# Patient Record
Sex: MALE | Race: WHITE | Employment: OTHER | URBAN - METROPOLITAN AREA
[De-identification: names, ages, dates, MRNs, and addresses within clinical notes are randomized per-mention and may not be internally consistent; named-entity substitution may affect disease eponyms.]

---

## 2018-06-21 ENCOUNTER — TELEPHONE (OUTPATIENT)
Dept: FAMILY MEDICINE CLINIC | Age: 68
End: 2018-06-21

## 2018-06-21 ENCOUNTER — TELEPHONE (OUTPATIENT)
Dept: CARDIOLOGY CLINIC | Age: 68
End: 2018-06-21

## 2018-06-21 ENCOUNTER — OFFICE VISIT (OUTPATIENT)
Dept: FAMILY MEDICINE CLINIC | Age: 68
End: 2018-06-21

## 2018-06-21 VITALS
SYSTOLIC BLOOD PRESSURE: 136 MMHG | HEIGHT: 70 IN | WEIGHT: 315 LBS | BODY MASS INDEX: 45.1 KG/M2 | DIASTOLIC BLOOD PRESSURE: 84 MMHG

## 2018-06-21 DIAGNOSIS — I48.91 NEW ONSET ATRIAL FIBRILLATION (HCC): Primary | ICD-10-CM

## 2018-06-21 DIAGNOSIS — Z00.00 PREVENTATIVE HEALTH CARE: ICD-10-CM

## 2018-06-21 DIAGNOSIS — R53.1 WEAKNESS: ICD-10-CM

## 2018-06-21 DIAGNOSIS — Z23 NEED FOR PNEUMOCOCCAL VACCINATION: ICD-10-CM

## 2018-06-21 DIAGNOSIS — R06.02 SHORTNESS OF BREATH: ICD-10-CM

## 2018-06-21 DIAGNOSIS — E66.01 MORBID OBESITY DUE TO EXCESS CALORIES (HCC): ICD-10-CM

## 2018-06-21 DIAGNOSIS — Z23 NEED FOR ZOSTER VACCINATION: ICD-10-CM

## 2018-06-21 DIAGNOSIS — Z11.59 ENCOUNTER FOR HEPATITIS C SCREENING TEST FOR LOW RISK PATIENT: ICD-10-CM

## 2018-06-21 DIAGNOSIS — Z13.6 SCREENING FOR AAA (ABDOMINAL AORTIC ANEURYSM): ICD-10-CM

## 2018-06-21 PROBLEM — I48.20 CHRONIC ATRIAL FIBRILLATION (HCC): Status: ACTIVE | Noted: 2018-06-21

## 2018-06-21 LAB
ANION GAP SERPL CALCULATED.3IONS-SCNC: 15 MMOL/L (ref 3–16)
BUN BLDV-MCNC: 21 MG/DL (ref 7–20)
CALCIUM SERPL-MCNC: 9.3 MG/DL (ref 8.3–10.6)
CHLORIDE BLD-SCNC: 107 MMOL/L (ref 99–110)
CHOLESTEROL, TOTAL: 184 MG/DL (ref 0–199)
CO2: 22 MMOL/L (ref 21–32)
CREAT SERPL-MCNC: 0.9 MG/DL (ref 0.8–1.3)
GFR AFRICAN AMERICAN: >60
GFR NON-AFRICAN AMERICAN: >60
GLUCOSE BLD-MCNC: 101 MG/DL (ref 70–99)
HCT VFR BLD CALC: 41.6 % (ref 40.5–52.5)
HDLC SERPL-MCNC: 57 MG/DL (ref 40–60)
HEMOGLOBIN: 13.9 G/DL (ref 13.5–17.5)
HEPATITIS C ANTIBODY INTERPRETATION: NORMAL
LDL CHOLESTEROL CALCULATED: 109 MG/DL
MCH RBC QN AUTO: 29.7 PG (ref 26–34)
MCHC RBC AUTO-ENTMCNC: 33.4 G/DL (ref 31–36)
MCV RBC AUTO: 89 FL (ref 80–100)
PDW BLD-RTO: 14 % (ref 12.4–15.4)
PLATELET # BLD: 233 K/UL (ref 135–450)
PMV BLD AUTO: 9 FL (ref 5–10.5)
POTASSIUM SERPL-SCNC: 4.6 MMOL/L (ref 3.5–5.1)
PRO-BNP: 1023 PG/ML (ref 0–124)
RBC # BLD: 4.67 M/UL (ref 4.2–5.9)
SODIUM BLD-SCNC: 144 MMOL/L (ref 136–145)
TRIGL SERPL-MCNC: 89 MG/DL (ref 0–150)
TSH SERPL DL<=0.05 MIU/L-ACNC: 1.65 UIU/ML (ref 0.27–4.2)
VLDLC SERPL CALC-MCNC: 18 MG/DL
WBC # BLD: 6.2 K/UL (ref 4–11)

## 2018-06-21 PROCEDURE — 90670 PCV13 VACCINE IM: CPT | Performed by: FAMILY MEDICINE

## 2018-06-21 PROCEDURE — G8417 CALC BMI ABV UP PARAM F/U: HCPCS | Performed by: FAMILY MEDICINE

## 2018-06-21 PROCEDURE — 1123F ACP DISCUSS/DSCN MKR DOCD: CPT | Performed by: FAMILY MEDICINE

## 2018-06-21 PROCEDURE — 4040F PNEUMOC VAC/ADMIN/RCVD: CPT | Performed by: FAMILY MEDICINE

## 2018-06-21 PROCEDURE — G0009 ADMIN PNEUMOCOCCAL VACCINE: HCPCS | Performed by: FAMILY MEDICINE

## 2018-06-21 PROCEDURE — G8427 DOCREV CUR MEDS BY ELIG CLIN: HCPCS | Performed by: FAMILY MEDICINE

## 2018-06-21 PROCEDURE — 3017F COLORECTAL CA SCREEN DOC REV: CPT | Performed by: FAMILY MEDICINE

## 2018-06-21 PROCEDURE — 36415 COLL VENOUS BLD VENIPUNCTURE: CPT | Performed by: FAMILY MEDICINE

## 2018-06-21 PROCEDURE — 1036F TOBACCO NON-USER: CPT | Performed by: FAMILY MEDICINE

## 2018-06-21 PROCEDURE — 99214 OFFICE O/P EST MOD 30 MIN: CPT | Performed by: FAMILY MEDICINE

## 2018-06-21 PROCEDURE — 93000 ELECTROCARDIOGRAM COMPLETE: CPT | Performed by: FAMILY MEDICINE

## 2018-06-21 ASSESSMENT — PATIENT HEALTH QUESTIONNAIRE - PHQ9
2. FEELING DOWN, DEPRESSED OR HOPELESS: 0
SUM OF ALL RESPONSES TO PHQ QUESTIONS 1-9: 1
1. LITTLE INTEREST OR PLEASURE IN DOING THINGS: 1
SUM OF ALL RESPONSES TO PHQ9 QUESTIONS 1 & 2: 1

## 2018-06-21 ASSESSMENT — ENCOUNTER SYMPTOMS
COUGH: 0
SHORTNESS OF BREATH: 1
WHEEZING: 0

## 2018-06-22 ENCOUNTER — HOSPITAL ENCOUNTER (OUTPATIENT)
Dept: ULTRASOUND IMAGING | Age: 68
Discharge: HOME | End: 2018-06-22
Attending: FAMILY MEDICINE | Admitting: FAMILY MEDICINE

## 2018-06-22 DIAGNOSIS — Z13.6 ENCOUNTER FOR SCREENING FOR CARDIOVASCULAR DISORDERS: ICD-10-CM

## 2018-06-22 DIAGNOSIS — Z13.6 SCREENING FOR AAA (ABDOMINAL AORTIC ANEURYSM): ICD-10-CM

## 2018-06-22 RX ORDER — FUROSEMIDE 40 MG/1
40 TABLET ORAL DAILY
Qty: 7 TABLET | Refills: 0 | Status: SHIPPED | OUTPATIENT
Start: 2018-06-22 | End: 2018-06-27 | Stop reason: SDUPTHER

## 2018-06-25 ENCOUNTER — TELEPHONE (OUTPATIENT)
Dept: FAMILY MEDICINE CLINIC | Age: 68
End: 2018-06-25

## 2018-06-27 ENCOUNTER — OFFICE VISIT (OUTPATIENT)
Dept: CARDIOLOGY CLINIC | Age: 68
End: 2018-06-27

## 2018-06-27 VITALS
BODY MASS INDEX: 45.1 KG/M2 | SYSTOLIC BLOOD PRESSURE: 134 MMHG | HEART RATE: 78 BPM | OXYGEN SATURATION: 96 % | DIASTOLIC BLOOD PRESSURE: 78 MMHG | WEIGHT: 315 LBS | HEIGHT: 70 IN

## 2018-06-27 DIAGNOSIS — I48.91 NEW ONSET ATRIAL FIBRILLATION (HCC): Primary | ICD-10-CM

## 2018-06-27 DIAGNOSIS — R06.02 SOB (SHORTNESS OF BREATH): ICD-10-CM

## 2018-06-27 DIAGNOSIS — I48.91 NEW ONSET ATRIAL FIBRILLATION (HCC): ICD-10-CM

## 2018-06-27 LAB
ANION GAP SERPL CALCULATED.3IONS-SCNC: 18 MMOL/L (ref 3–16)
BUN BLDV-MCNC: 20 MG/DL (ref 7–20)
CALCIUM SERPL-MCNC: 9.1 MG/DL (ref 8.3–10.6)
CHLORIDE BLD-SCNC: 98 MMOL/L (ref 99–110)
CO2: 24 MMOL/L (ref 21–32)
CREAT SERPL-MCNC: 1 MG/DL (ref 0.8–1.3)
GFR AFRICAN AMERICAN: >60
GFR NON-AFRICAN AMERICAN: >60
GLUCOSE BLD-MCNC: 96 MG/DL (ref 70–99)
POTASSIUM SERPL-SCNC: 4.3 MMOL/L (ref 3.5–5.1)
SODIUM BLD-SCNC: 140 MMOL/L (ref 136–145)

## 2018-06-27 PROCEDURE — G8427 DOCREV CUR MEDS BY ELIG CLIN: HCPCS | Performed by: INTERNAL MEDICINE

## 2018-06-27 PROCEDURE — 93000 ELECTROCARDIOGRAM COMPLETE: CPT | Performed by: INTERNAL MEDICINE

## 2018-06-27 PROCEDURE — G8417 CALC BMI ABV UP PARAM F/U: HCPCS | Performed by: INTERNAL MEDICINE

## 2018-06-27 PROCEDURE — 99205 OFFICE O/P NEW HI 60 MIN: CPT | Performed by: INTERNAL MEDICINE

## 2018-06-27 PROCEDURE — 3017F COLORECTAL CA SCREEN DOC REV: CPT | Performed by: INTERNAL MEDICINE

## 2018-06-27 RX ORDER — METOPROLOL SUCCINATE 25 MG/1
12.5 TABLET, EXTENDED RELEASE ORAL DAILY
Qty: 30 TABLET | Refills: 3 | Status: SHIPPED | OUTPATIENT
Start: 2018-06-27 | End: 2018-07-31 | Stop reason: SDUPTHER

## 2018-06-27 RX ORDER — FUROSEMIDE 40 MG/1
40 TABLET ORAL DAILY
Qty: 30 TABLET | Refills: 4 | Status: SHIPPED | OUTPATIENT
Start: 2018-06-27 | End: 2018-07-24 | Stop reason: SDUPTHER

## 2018-07-03 ENCOUNTER — OFFICE VISIT (OUTPATIENT)
Dept: SLEEP MEDICINE | Age: 68
End: 2018-07-03

## 2018-07-03 ENCOUNTER — TELEPHONE (OUTPATIENT)
Dept: CARDIOLOGY CLINIC | Age: 68
End: 2018-07-03

## 2018-07-03 VITALS
HEIGHT: 68 IN | HEART RATE: 70 BPM | SYSTOLIC BLOOD PRESSURE: 126 MMHG | WEIGHT: 315 LBS | RESPIRATION RATE: 16 BRPM | TEMPERATURE: 98 F | DIASTOLIC BLOOD PRESSURE: 80 MMHG | OXYGEN SATURATION: 97 % | BODY MASS INDEX: 47.74 KG/M2

## 2018-07-03 DIAGNOSIS — G47.33 OSA (OBSTRUCTIVE SLEEP APNEA): Primary | ICD-10-CM

## 2018-07-03 DIAGNOSIS — I48.20 CHRONIC ATRIAL FIBRILLATION (HCC): ICD-10-CM

## 2018-07-03 PROCEDURE — G8427 DOCREV CUR MEDS BY ELIG CLIN: HCPCS | Performed by: PSYCHIATRY & NEUROLOGY

## 2018-07-03 PROCEDURE — 1036F TOBACCO NON-USER: CPT | Performed by: PSYCHIATRY & NEUROLOGY

## 2018-07-03 PROCEDURE — 4040F PNEUMOC VAC/ADMIN/RCVD: CPT | Performed by: PSYCHIATRY & NEUROLOGY

## 2018-07-03 PROCEDURE — G8417 CALC BMI ABV UP PARAM F/U: HCPCS | Performed by: PSYCHIATRY & NEUROLOGY

## 2018-07-03 PROCEDURE — 99204 OFFICE O/P NEW MOD 45 MIN: CPT | Performed by: PSYCHIATRY & NEUROLOGY

## 2018-07-03 PROCEDURE — 1123F ACP DISCUSS/DSCN MKR DOCD: CPT | Performed by: PSYCHIATRY & NEUROLOGY

## 2018-07-03 PROCEDURE — 3017F COLORECTAL CA SCREEN DOC REV: CPT | Performed by: PSYCHIATRY & NEUROLOGY

## 2018-07-03 ASSESSMENT — SLEEP AND FATIGUE QUESTIONNAIRES
HOW LIKELY ARE YOU TO NOD OFF OR FALL ASLEEP IN A CAR, WHILE STOPPED FOR A FEW MINUTES IN TRAFFIC: 0
HOW LIKELY ARE YOU TO NOD OFF OR FALL ASLEEP WHILE LYING DOWN TO REST IN THE AFTERNOON WHEN CIRCUMSTANCES PERMIT: 3
NECK CIRCUMFERENCE (INCHES): 17
HOW LIKELY ARE YOU TO NOD OFF OR FALL ASLEEP WHILE WATCHING TV: 3
HOW LIKELY ARE YOU TO NOD OFF OR FALL ASLEEP WHILE SITTING AND TALKING TO SOMEONE: 0
HOW LIKELY ARE YOU TO NOD OFF OR FALL ASLEEP WHILE SITTING INACTIVE IN A PUBLIC PLACE: 1
HOW LIKELY ARE YOU TO NOD OFF OR FALL ASLEEP WHILE SITTING QUIETLY AFTER LUNCH WITHOUT ALCOHOL: 0
HOW LIKELY ARE YOU TO NOD OFF OR FALL ASLEEP WHILE SITTING AND READING: 1
HOW LIKELY ARE YOU TO NOD OFF OR FALL ASLEEP WHEN YOU ARE A PASSENGER IN A CAR FOR AN HOUR WITHOUT A BREAK: 1
ESS TOTAL SCORE: 9

## 2018-07-03 ASSESSMENT — ENCOUNTER SYMPTOMS
EYES NEGATIVE: 1
ALLERGIC/IMMUNOLOGIC NEGATIVE: 1
SHORTNESS OF BREATH: 1
GASTROINTESTINAL NEGATIVE: 1
COUGH: 1

## 2018-07-03 NOTE — TELEPHONE ENCOUNTER
Patient stopped in with a question about his metoprolol dosage. He has been taking one full tablet daily because he thought that was what he was instructed to do, but the pharmacy indicated it is 0.5 mg daily. He wants to know if this will cause any problem that he has taken this extra medication for the last week or two. Patient can be reached at [phone].

## 2018-07-03 NOTE — PROGRESS NOTES
MD THA Garcia Board Certified in Sleep Medicine  Certified in [address] [place] [address] Certified in Neurology Danielle Юлия Caballero [place] [address] [place] [address] ([address] Sleep   [facility]70 [facility], [address] [place] [address] 733 [facility] SLEEP MEDICINE [place]    Subjective:     Patient ID: Ame Coker is a 79 y.o. male. Chief Complaint   Patient presents with    Other     Dr. Cameron Corey for sleep        HPI:        Ame Coker is a 79 y.o. male referred by Dr Cameron Corey for a sleep evaluation. He complains of snoring, excessive daytime sleepiness, feels sleepy during the day, take naps during the day but he denies snorting, choking, periods of not breathing, knees buckling with laughing, completely or partially paralyzed while falling asleep or waking up, difficulty falling asleep once awakened, noisy environment, uncomfortable room temperature, uncomfortable bedding. Symptoms began a few years ago, gradually worsening since that time. The patient's bed-partner confirmed in the past the snoring but not the stopped breathing at night  SLEEP SCHEDULE: Goes to bed around 9#0-10 PM in the weekdays and 9:30-10 PM in the weekends. It usually takes the patient 10 minutes to fall asleep. The patient gets up 1-3 per night to go to the bathroom. The Patient finally gets up at 5-6 AM during the weekdays and 5-6 AM in the weekends. patient wakes up with dry mouth and sometimes morning headache. . the headache usually dull headache lasts 30-60 minutes. The patient has restless sleep with frequent arousals in addition to the Patient has significant daytime sleepiness. The Patient scored Total score: 9 on [place] Sleepiness Scale ( more than 10 is indicative of daytime sleepiness) and 30 in fatigue scale ( more than 36 is indicative of daytime fatigue).  The patient takes daily nap for 10-15 minutes and usually is not refreshing nap. Previous evaluation and treatment has included- PSG. DOT/CDL - No  FAA/'s license - No      Previous Report(s) Reviewed: historical medical records         Social History     Social History    Marital status:      Spouse name: N/A    Number of children: 2    Years of education: N/A     Occupational History    Retired [address] [place] [address] History Main Topics    Smoking status: Former Smoker     Quit date: 2/26/2007    Smokeless tobacco: Never Used    Alcohol use Yes      Comment: Social; very rare    Drug use: No    Sexual activity: Not on file     Other Topics Concern    Not on file     Social History Narrative    No narrative on file       Prior to Admission medications    Medication Sig Start Date End Date Taking?  Authorizing Provider   metoprolol succinate (TOPROL XL) 25 MG extended release tablet Take 0.5 tablets by mouth daily  Patient taking differently: Take 25 mg by mouth daily  6/27/18  Yes Monica Alvarado MD   furosemide (LASIX) 40 MG tablet Take 1 tablet by mouth daily 6/27/18 7/27/18 Yes Monica Alvarado MD   rivaroxaban (XARELTO) 20 MG TABS tablet Take 1 tablet by mouth daily (with breakfast) 6/27/18  Yes Monica Alvarado MD       Allergies as of 07/03/2018    (No Known Allergies)       Patient Active Problem List   Diagnosis    Pilonidal cyst    Osteoarthritis    Former smoker    Morbid obesity due to excess calories ([address]    Primary osteoarthritis involving multiple joints    Chronic atrial fibrillation ([address]       Past Medical History:   Diagnosis Date    Chronic atrial fibrillation ([address] 06/21/2018    Dr. Rey Gil Former smoker     Quit 2007    Morbid obesity due to excess calories ([address]     Pilonidal cyst     Primary osteoarthritis involving multiple joints        Past Surgical History:   Procedure Laterality Date    AXILLARY SURGERY Right     Cyst Excision    COLONOSCOPY  10/16/2015    crowded oropharynx with low soft palate, high arched hard palate,no tonsils enlargement. Eyes: EOM are normal.   Neck: Normal range of motion. Neck supple. No tracheal deviation present. No thyromegaly present. Cardiovascular: Normal heart sounds and intact distal pulses. irregular irregularity    Pulmonary/Chest: Effort normal and breath sounds normal. No respiratory distress. He has no wheezes. Musculoskeletal: Normal range of motion. He exhibits no edema or tenderness. Neurological: He is alert. He has normal reflexes. No cranial nerve deficit. Skin: Skin is warm. Psychiatric: He has a normal mood and affect. Nursing note and vitals reviewed. Assessment:    Obstructive sleep apnea especially with snoring, daytime sleepiness, large neck circumference, Mallampati class of 3 and obesity. Diagnosis Orders   1. DIANE (obstructive sleep apnea)  Baseline Diagnostic Sleep Study    Sleep Study with PAP Titration   2. Chronic atrial fibrillation (HCC)  Baseline Diagnostic Sleep Study    Sleep Study with PAP Titration     Plan:     Patient was counseled about the pathophysiology of obstructive sleep apnea syndrome and the methods for evaluating its presence and severity. Patient was counseled to avoid driving and other potentially hazardous circumstances if the patient is experiencing excessive sleepiness. Treatment considerations include the use of nasal CPAP, oral dental appliance or a surgical intervention, which should be based on otolarygologic findings, In the meantime, the patient should be cautioned to avoid the use of alcohol or other depressant medications because of potential for increasing the duration and severity of apnea and cautioned regarding driving or operating and dangerous equipment if the patient is experiencing daytime sleepiness. .                  Orders Placed This Encounter   Procedures    Baseline Diagnostic Sleep Study    Sleep Study with PAP Titration Return in about 3 months (around 10/3/2018) for to review the PSG and CPAP usage, Reveiwing CPAP usage and compliance report and tro.     Yobany Barrett MD  Medical Director [address] [place] [address]

## 2018-07-03 NOTE — PATIENT INSTRUCTIONS
problems, such as a stuffy nose, caused by a cold or allergies. · Try a continuous positive airway pressure (CPAP) breathing machine if your doctor recommends it. The machine keeps your airway open when you sleep. · If CPAP does not work for you, ask your doctor if you can try other breathing machines. A bilevel positive airway pressure machine uses one type of air pressure for breathing in and another type for breathing out. Another device raises or lowers air pressure as needed while you breathe. · Talk to your doctor if:  ¨ Your nose feels dry or bleeds when you use one of these machines. You may need to increase moisture in the air. A humidifier may help. ¨ Your nose is runny or stuffy from using a breathing machine. Decongestants or a corticosteroid nasal spray may help. ¨ You are sleepy during the day and it gets in the way of the normal things you do. Do not drive when you are drowsy. When should you call for help? Watch closely for changes in your health, and be sure to contact your doctor if:    · You still have sleep apnea even though you have made lifestyle changes.     · You are thinking of trying a device such as CPAP.     · You are having problems using a CPAP or similar machine. Where can you learn more? Go to https://[email or web address].[email or web address] org and sign in to your [employer] account. Enter W089 in the [employer]mOne box to learn more about \"Sleep Apnea: Care Instructions. \"     If you do not have an account, please click on the \"Sign Up Now\" link. Current as of: December 6, 2017  Content Version: 11.6  © 7565-9511 [employer]. Care instructions adapted under license by [facility] [employer] [facility] ([facility]). If you have questions about a medical condition or this instruction, always ask your healthcare professional. [place] [address] any warranty or liability for your use of this information.

## 2018-07-23 ENCOUNTER — HOSPITAL ENCOUNTER (OUTPATIENT)
Dept: NON INVASIVE DIAGNOSTICS | Age: 68
Discharge: HOME | End: 2018-07-23
Attending: INTERNAL MEDICINE | Admitting: INTERNAL MEDICINE

## 2018-07-23 DIAGNOSIS — R06.02 SOB (SHORTNESS OF BREATH): ICD-10-CM

## 2018-07-23 DIAGNOSIS — I48.91 ATRIAL FIBRILLATION (HCC): ICD-10-CM

## 2018-07-23 DIAGNOSIS — I48.91 NEW ONSET ATRIAL FIBRILLATION (HCC): ICD-10-CM

## 2018-07-23 LAB
LV EF: 48 %
LVEF MODALITY: NORMAL

## 2018-07-24 ENCOUNTER — HOSPITAL ENCOUNTER (OUTPATIENT)
Dept: CARDIAC CATH/INVASIVE PROCEDURES | Age: 68
Discharge: HOME | End: 2018-07-24
Attending: INTERNAL MEDICINE | Admitting: INTERNAL MEDICINE

## 2018-07-24 VITALS — WEIGHT: 315 LBS | BODY MASS INDEX: 51.49 KG/M2

## 2018-07-24 PROCEDURE — 92960 CARDIOVERSION ELECTRIC EXT: CPT | Performed by: INTERNAL MEDICINE

## 2018-07-24 RX ORDER — SODIUM CHLORIDE 9 MG/ML
INJECTION, SOLUTION INTRAVENOUS CONTINUOUS
Status: DISCONTINUED | OUTPATIENT
Start: 2018-07-24 | End: 2018-07-24 | Stop reason: SDUPTHER

## 2018-07-24 RX ORDER — SODIUM CHLORIDE 0.9 % (FLUSH) 0.9 %
10 SYRINGE (ML) INJECTION PRN
Status: DISCONTINUED | OUTPATIENT
Start: 2018-07-24 | End: 2018-07-25 | Stop reason: HOSPADM

## 2018-07-24 RX ORDER — FUROSEMIDE 40 MG/1
40 TABLET ORAL DAILY
Qty: 90 TABLET | Refills: 1 | Status: SHIPPED | OUTPATIENT
Start: 2018-07-24 | End: 2019-01-16 | Stop reason: SDUPTHER

## 2018-07-24 RX ORDER — SODIUM CHLORIDE 0.9 % (FLUSH) 0.9 %
10 SYRINGE (ML) INJECTION PRN
Status: DISCONTINUED | OUTPATIENT
Start: 2018-07-24 | End: 2018-07-24 | Stop reason: ALTCHOICE

## 2018-07-24 RX ORDER — SODIUM CHLORIDE 0.9 % (FLUSH) 0.9 %
10 SYRINGE (ML) INJECTION PRN
Status: DISCONTINUED | OUTPATIENT
Start: 2018-07-24 | End: 2018-07-24 | Stop reason: SDUPTHER

## 2018-07-24 RX ORDER — SODIUM CHLORIDE 0.9 % (FLUSH) 0.9 %
10 SYRINGE (ML) INJECTION EVERY 12 HOURS SCHEDULED
Status: DISCONTINUED | OUTPATIENT
Start: 2018-07-24 | End: 2018-07-24 | Stop reason: ALTCHOICE

## 2018-07-24 RX ORDER — SODIUM CHLORIDE 0.9 % (FLUSH) 0.9 %
10 SYRINGE (ML) INJECTION EVERY 12 HOURS SCHEDULED
Status: DISCONTINUED | OUTPATIENT
Start: 2018-07-24 | End: 2018-07-24 | Stop reason: SDUPTHER

## 2018-07-24 RX ORDER — SODIUM CHLORIDE 9 MG/ML
INJECTION, SOLUTION INTRAVENOUS CONTINUOUS
Status: DISCONTINUED | OUTPATIENT
Start: 2018-07-24 | End: 2018-07-25 | Stop reason: HOSPADM

## 2018-07-24 RX ORDER — SODIUM CHLORIDE 0.9 % (FLUSH) 0.9 %
10 SYRINGE (ML) INJECTION EVERY 12 HOURS SCHEDULED
Status: DISCONTINUED | OUTPATIENT
Start: 2018-07-24 | End: 2018-07-25 | Stop reason: HOSPADM

## 2018-07-24 NOTE — PROCEDURES
[address] [place] [address] [facility]                                  PROCEDURE NOTE    PATIENT NAME: Jacklyn Kwon                    :        1950  MED REC NO:   [id number]                          ROOM:  ACCOUNT NO:   [de-identified]                          ADMIT DATE: 2018  PROVIDER:     Deep Pinto MD    DATE OF PROCEDURE:  2018    INDICATIONS FOR PROCEDURE:  Atrial fibrillation. PROCEDURE:  The risks and benefits of the procedure were explained to the  patient. Informed consent was obtained. He was placed on the  catheterization lab recovery area in the supine position. Cardioversion  pads were placed in an anterior-posterior distribution. Emergency  equipment was in place. The patient had an ASA grade of II and a  Mallampati score of II. He received deep sedation with 60 mg of IV  Brevital for sedation. When deep sedation was achieved, he received a  single 200 J synchronized cardioversion shock successfully converting him  from atrial fibrillation to a normal sinus rhythm. Vital signs were  monitored throughout the procedure and remained stable. There were no  complications from the procedure or the deep sedation. The patient recovered with no neurologic deficits. ASSESSMENT:  Successful synchronized cardioversion from atrial fibrillation  to a normal sinus rhythm with a single 200 J biphasic synchronized  cardioversion shock.         Ruddy Hopkins MD    D: 2018 11:00:39       T: 2018 11:02:09     [email or web address]  Job#: [id number]     Doc#: [id number]    CC:  Sherry Sotelo DO

## 2018-07-24 NOTE — H&P
Reason for Consult:  Atrial Fibrilllation     HPI:  The patient is 79 y.o. male with no significant medical history that presents as referral from Dr. Héctor Hernandez for evaluation of new onset atrial fibrillation.      He presented to Dr. Héctor Hernandez a couple weeks ago due to shortness of breath with associated lightheadedness and weakness. He first attributed this to the warm weather. He later experienced heart flutters. He reports feeling well rested when waking in the morning. He is unable to lay flat in bed to sleep due to arthritic pain. He reports an improvement in his breathing since starting diuretic therapy and has also lost 4 pounds.      Mother has atrial fibrillation as well as one of his brothers. One of his brothers passed from a heart attack but was a diabetic with poor control. He was a previous smoker and quit 11 years ago. He has lost a significant amount of weight due to dietary changes.      Review of Systems:  Constitutional: No fatigue, weakness, night sweats or fever. HEENT: No new vision difficulties or ringing in the ears. Respiratory: No new SOB, PND, orthopnea or cough. Cardiovascular: See HPI   GI: No n/v, diarrhea, constipation, abdominal pain or changes in bowel habits. No melena, no hematochezia  : No urinary frequency, urgency, incontinence, hematuria or dysuria. Skin: No cyanosis or skin lesions. Musculoskeletal: No new muscle or joint pain. Neurological: No syncope or TIA-like symptoms.   Psychiatric: No anxiety, insomnia or depression     Past Medical History        Past Medical History:   Diagnosis Date    Chronic atrial fibrillation ([address] 06/21/2018     Dr. Eli Simons Former smoker       Quit 2007    Morbid obesity due to excess calories ([address]      Pilonidal cyst      Primary osteoarthritis involving multiple joints           Past Surgical History         Past Surgical History:   Procedure Laterality Date    AXILLARY SURGERY Right       Cyst Excision    COLONOSCOPY   friction rub. No murmur heard. Pulmonary/Chest: Effort normal and breath sounds normal. No respiratory distress. He has no wheezes, rhonchi or rales. Abdominal: Soft, non-tender. Bowel sounds and aorta are normal. He exhibits no organomegaly, mass or bruit. Extremities: No edema, cyanosis, or clubbing. Pulses are 2+ radial/carotid/dorsalis pedis and posterior tibial bilaterally. Neurological: He is alert and oriented to person, place, and time. He has normal reflexes. No cranial nerve deficit. Coordination normal.   Skin: Skin is warm and dry. There is no rash or diaphoresis. Psychiatric: He has a normal mood and affect. His speech is normal and behavior is normal.      EKG Interpretation: 6/27/18 Atrial fibrillation with RVR, poor R wave progression     [id number] Score for Atrial Fibrillation Stroke Risk    Risk   Factors   Component Value   C CHF No 0   H HTN No 0   A2 Age >= 75 No,  (70 y.o.) 0   D DM No 0   S2 Prior Stroke/TIA No 0   V Vascular Disease No 0   A Age 74-69 Yes,  (70 y.o.) 1   Sc Sex male 0     [id number]  Score   1   Score last updated 9/02/82 34:97 AM     Click here for a link to the UpToDate guideline \"Atrial Fibrillation: Anticoagulation therapy to prevent embolization     Disclaimer: Risk Score calculation is dependent on accuracy of patient problem list and past encounter diagnosis.       Lab Review:         Lab Results   Component Value Date     TRIG 89 06/21/2018     HDL 57 06/21/2018     LDLCALC 109 06/21/2018     LABVLDL 18 06/21/2018            Lab Results   Component Value Date      06/21/2018     K 4.6 06/21/2018     BUN 21 06/21/2018     CREATININE 0.9 06/21/2018            Assessment:  1. New onset atrial fibrillation   2. Shortness of breath         Plan:  I will have him remain on Xarelto at this time for anticoagulation. Once he has been anticoagulated for 4 weeks, I recommend the patient pursue a direct current cardioversion.  I did tell him that it may not be ultimately successful or have a lasting effect if he has been in atrial fibrillation for some time. The risks, benefits and alternatives to the procedure were discussed with the patient. The risks include but are not limited to: stroke, respiratory failure, arrhythmia and death. In the meantime, I will have him begin taking Toprol 12.5mg. He needs a sleep study to exclude DIANE and a baseline echocardiogram. He should continue on lasix 40mg daily but will need a repeat BMP in a week.      We will arrange follow-up after his cardioversion in 4 weeks.      I reviewed my H&P and there have been no changes. He is here for a cardioversion today.      Sil Sr MD

## 2018-07-25 LAB
EKG ATRIAL RATE: 54 BPM
EKG DIAGNOSIS: NORMAL
EKG P AXIS: 27 DEGREES
EKG P-R INTERVAL: 234 MS
EKG Q-T INTERVAL: 518 MS
EKG QRS DURATION: 104 MS
EKG QTC CALCULATION (BAZETT): 491 MS
EKG R AXIS: 38 DEGREES
EKG T AXIS: 7 DEGREES
EKG VENTRICULAR RATE: 54 BPM

## 2018-07-25 PROCEDURE — 93010 ELECTROCARDIOGRAM REPORT: CPT | Performed by: INTERNAL MEDICINE

## 2018-07-26 ENCOUNTER — HOSPITAL ENCOUNTER (OUTPATIENT)
Dept: OTHER | Age: 68
Discharge: HOME | End: 2018-07-28
Attending: PSYCHIATRY & NEUROLOGY | Admitting: PSYCHIATRY & NEUROLOGY

## 2018-07-26 DIAGNOSIS — G47.33 OSA (OBSTRUCTIVE SLEEP APNEA): ICD-10-CM

## 2018-07-26 DIAGNOSIS — I48.20 CHRONIC ATRIAL FIBRILLATION (HCC): ICD-10-CM

## 2018-07-26 PROCEDURE — 95810 POLYSOM 6/> YRS 4/> PARAM: CPT | Performed by: PSYCHIATRY & NEUROLOGY

## 2018-07-31 ENCOUNTER — OFFICE VISIT (OUTPATIENT)
Dept: CARDIOLOGY CLINIC | Age: 68
End: 2018-07-31

## 2018-07-31 VITALS
HEART RATE: 57 BPM | BODY MASS INDEX: 45.1 KG/M2 | OXYGEN SATURATION: 99 % | WEIGHT: 315 LBS | DIASTOLIC BLOOD PRESSURE: 68 MMHG | HEIGHT: 70 IN | SYSTOLIC BLOOD PRESSURE: 118 MMHG

## 2018-07-31 DIAGNOSIS — I48.0 PAROXYSMAL ATRIAL FIBRILLATION (HCC): Primary | ICD-10-CM

## 2018-07-31 PROCEDURE — 1101F PT FALLS ASSESS-DOCD LE1/YR: CPT | Performed by: NURSE PRACTITIONER

## 2018-07-31 PROCEDURE — 1036F TOBACCO NON-USER: CPT | Performed by: NURSE PRACTITIONER

## 2018-07-31 PROCEDURE — 93000 ELECTROCARDIOGRAM COMPLETE: CPT | Performed by: NURSE PRACTITIONER

## 2018-07-31 PROCEDURE — G8417 CALC BMI ABV UP PARAM F/U: HCPCS | Performed by: NURSE PRACTITIONER

## 2018-07-31 PROCEDURE — 99213 OFFICE O/P EST LOW 20 MIN: CPT | Performed by: NURSE PRACTITIONER

## 2018-07-31 PROCEDURE — 4040F PNEUMOC VAC/ADMIN/RCVD: CPT | Performed by: NURSE PRACTITIONER

## 2018-07-31 PROCEDURE — G8427 DOCREV CUR MEDS BY ELIG CLIN: HCPCS | Performed by: NURSE PRACTITIONER

## 2018-07-31 PROCEDURE — 3017F COLORECTAL CA SCREEN DOC REV: CPT | Performed by: NURSE PRACTITIONER

## 2018-07-31 PROCEDURE — 1123F ACP DISCUSS/DSCN MKR DOCD: CPT | Performed by: NURSE PRACTITIONER

## 2018-07-31 RX ORDER — METOPROLOL SUCCINATE 25 MG/1
12.5 TABLET, EXTENDED RELEASE ORAL DAILY
Qty: 45 TABLET | Refills: 2 | Status: SHIPPED | OUTPATIENT
Start: 2018-07-31 | End: 2019-01-07

## 2018-07-31 NOTE — LETTER
[facility] HEART [place] [address] [address] [facility]  Phone: [phone]  Fax: [phone]    VANDANA Zayas - CONI        July 31     Delilah Phillips, [address] [place] [address]    Patient: Moshe Chan  MR Number: [id number]  YOB: 1950  Date of Visit: 7/31/2018    Dear Dr. Delilah Phillips:    Thank you for the request for consultation for Ellis Freire. HPI:  The patient is 79 y.o. male with a past medical history significant for atrial fib and morbid obesity who was recently seen by Dr. Zulay Knight with c/o SOB. He was noted to be in atrial fib, placed on Xarelto and BB x 4 weeks and advised to have DCCV and sleep study. He has completed the sleep study and awaiting results. On 7/24/2018 he underwent DCCV with 200J and converted to SR. Here today for follow up. Overall feeling well. Quit smoking 12 years ago. Denies chest pain/discomfort, SOB, orthopnea/PND, cough, palpitations, dizziness, syncope, edema , weight change or claudication. Continues to work on weight loss. Mobility limited by bilateral knee pain. Uses cane on occasion. Active but no regular aerobic exercise. Assessment:    1. Paroxysmal atrial fibrillation (HCC)  -maintaining SR   -on BB and Xarelto    2. Morbid obesity  -continued weight loss reinforced    Plan:    Continue Xarelto, Toprol and lasix  Discussed low fat/low sodium diet and reinforced regular aerobic exercise. Follow up eith Dr. Zulay Knight in 6 months or sooner if needed    Return in about 6 months (around 1/31/2019) for with Dr. Zulay Knight or sooner if needed. Thanks for allowing me to participate in the care of this patient. If you have questions, please do not hesitate to call me. I look forward to following Oleg Mcfarland along with you.     Sincerely,        VANDANA Zayas - CNP

## 2018-07-31 NOTE — PROGRESS NOTES
[facility]  Office Visit    Arthur De La Garza  1950 July 31, 2018    CC:   Chief Complaint   Patient presents with    Atrial Fibrillation     pt states he feel good/ SOB with normal activity/no other cardiac complaints at this time      HPI:  The patient is 79 y.o. male with a past medical history significant for atrial fib and morbid obesity who was recently seen by Dr. Kylah Marin with c/o SOB. He was noted to be in atrial fib, placed on Xarelto and BB x 4 weeks and advised to have DCCV and sleep study. He has completed the sleep study and awaiting results. On 7/24/2018 he underwent DCCV with 200J and converted to SR. Here today for follow up. Overall feeling well. Quit smoking 12 years ago. Denies chest pain/discomfort, SOB, orthopnea/PND, cough, palpitations, dizziness, syncope, edema , weight change or claudication. Continues to work on weight loss. Mobility limited by bilateral knee pain. Uses cane on occasion. Active but no regular aerobic exercise. Review of Systems:  Constitutional: Denies  fatigue, weakness, night sweats or fever. HEENT: Denies new visual changes, ringing in ears, nosebleeds, nasal congestion  Respiratory: Denies new or change in SOB, PND, orthopnea or cough. Cardiovascular: see HPI  GI: Denies N/V, diarrhea, constipation, abdominal pain, change in bowel habits, melena or hematochezia  : Denies urinary frequency, urgency, incontinence, hematuria or dysuria. Skin: Denies rash, hives, or cyanosis  Musculoskeletal:+ bilateral knee and hip pain  Neurological: Denies syncope or TIA-like symptoms.   Psychiatric: Denies anxiety, insomnia or depression     Past Medical History:   Diagnosis Date    Chronic atrial fibrillation ([address] 06/21/2018    Dr. Kylah Marin / Cardioversion 7-24-18    Former smoker     Quit 2007    Morbid obesity due to excess calories ([address]     Obstructive sleep apnea 07/2018    CPAP per Dr. Rosy Mg Pilonidal cyst     Primary osteoarthritis involving multiple joints      Past Surgical History:   Procedure Laterality Date    AXILLARY SURGERY Right     Cyst Excision    CARDIOVERSION  07/24/2018    Dr. Norris [place]    COLONOSCOPY  10/16/2015    CYST REMOVAL Bilateral     Groin    KNEE ARTHROSCOPY Left     PILONIDAL CYST EXCISION      Multiple    WISDOM TOOTH EXTRACTION       Family History   Problem Relation Age of Onset    Diabetes Mother     High Blood Pressure Mother     Heart Disease Mother         Arrhythmia    Stroke Mother     Diabetes Brother     Heart Disease Brother         Arrhythmia    Cancer Maternal Grandmother     Diabetes Paternal Grandmother     Diabetes Brother     Heart Disease Brother         MI    High Blood Pressure Brother      Social History   Substance Use Topics    Smoking status: Former Smoker     Quit date: 2/26/2007    Smokeless tobacco: Never Used    Alcohol use Yes      Comment: Social; very rare       No Known Allergies  Current Outpatient Prescriptions   Medication Sig Dispense Refill    rivaroxaban (XARELTO) 20 MG TABS tablet Take 1 tablet by mouth daily (with breakfast) 90 tablet 2    metoprolol succinate (TOPROL XL) 25 MG extended release tablet Take 0.5 tablets by mouth daily 45 tablet 2    furosemide (LASIX) 40 MG tablet Take 1 tablet by mouth daily 90 tablet 1     No current facility-administered medications for this visit. Physical Exam:   /68 (Site: Left Arm, Position: Sitting, Cuff Size: Large Adult)   Pulse 57   Ht 5' 10\" (1.778 m)   Wt (!) 337 lb (152.9 kg)   SpO2 99%   BMI 48.35 kg/m²   Wt Readings from Last 2 Encounters:   07/31/18 (!) 337 lb (152.9 kg)   07/24/18 (!) 338 lb 10 oz (153.6 kg)     Constitutional: He is oriented to person, place, and time. He is obese. In no acute distress. HEENT: Normocephalic and atraumatic. Sclerae anicteric. No xanthelasmas. Neck: Neck supple. No JVD present. Carotids without bruits.  No thyromegaly present. Cardiovascular: RRR, normal S1 and S2; no murmur/gallop or rub  Pulmonary/Chest: Effort normal.  Lungs clear to auscultation. Chest wall nontender  Abdominal: soft, nontender, nondistended. + bowel sounds  Extremities: No edema or cyanosis. Pulses are 2+ radial/pedal bilaterally. Cap refill brisk. Neurological: No focal deficit. Skin: Skin is warm and dry. Psychiatric: He has a normal mood and affect. His speech is normal and behavior is normal.     Lab Review:   Lab Results   Component Value Date    TRIG 89 06/21/2018    HDL 57 06/21/2018    LDLCALC 109 06/21/2018    LABVLDL 18 06/21/2018     Lab Results   Component Value Date     06/27/2018    K 4.3 06/27/2018    CL 98 06/27/2018    CO2 24 06/27/2018    BUN 20 06/27/2018    CREATININE 1.0 06/27/2018    GLUCOSE 96 06/27/2018    CALCIUM 9.1 06/27/2018      Lab Results   Component Value Date    WBC 6.2 06/21/2018    HGB 13.9 06/21/2018    HCT 41.6 06/21/2018    MCV 89.0 06/21/2018     06/21/2018     Echo 7/23/2018:  Normal LV size; Estimated ejection fraction is 45-50%. Cannot comment about  wall motion abn.   Left atrium is of normal size.   Right ventricular systolic function is normal.    7/24/2018 DCCV:  Successful synchronized cardioversion from atrial fibrillation to a normal sinus rhythm with a single 200 J biphasic synchronized cardioversion shock.     ECG 7/31/2018: Sinus bradycardia with 1st degree AV block    Assessment:    1. Paroxysmal atrial fibrillation (HCC)  -maintaining SR   -on BB and Xarelto    2. Morbid obesity  -continued weight loss reinforced    Plan:  Continue Xarelto, Toprol and lasix  Discussed low fat/low sodium diet and reinforced regular aerobic exercise. Follow up eith Dr. Angela Iyer in 6 months or sooner if needed    Return in about 6 months (around 1/31/2019) for with Dr. Angela Iyer or sooner if needed. Thanks for allowing me to participate in the care of this patient.       Usama Conn, [address] [facility] [place]

## 2018-08-02 ENCOUNTER — TELEPHONE (OUTPATIENT)
Dept: PULMONOLOGY | Age: 68
End: 2018-08-02

## 2018-08-10 ENCOUNTER — HOSPITAL ENCOUNTER (OUTPATIENT)
Dept: OTHER | Age: 68
Discharge: HOME | End: 2018-08-11
Admitting: PSYCHIATRY & NEUROLOGY

## 2018-08-10 DIAGNOSIS — G47.33 OSA (OBSTRUCTIVE SLEEP APNEA): ICD-10-CM

## 2018-08-10 DIAGNOSIS — I48.20 CHRONIC ATRIAL FIBRILLATION (HCC): ICD-10-CM

## 2018-08-10 PROCEDURE — 95811 POLYSOM 6/>YRS CPAP 4/> PARM: CPT | Performed by: PSYCHIATRY & NEUROLOGY

## 2018-08-13 RX ORDER — METOPROLOL SUCCINATE 25 MG/1
TABLET, EXTENDED RELEASE ORAL
Qty: 45 TABLET | Refills: 3 | Status: SHIPPED | OUTPATIENT
Start: 2018-08-13 | End: 2019-07-22 | Stop reason: ALTCHOICE

## 2018-08-15 ENCOUNTER — TELEPHONE (OUTPATIENT)
Dept: PULMONOLOGY | Age: 68
End: 2018-08-15

## 2018-08-15 NOTE — TELEPHONE ENCOUNTER
History of severe sleep apnea adequately controled on cpap pressure of 8    DME choice no preference will send to [facility]    Left a message for pt to call back    Order ready when pt calls back

## 2018-09-04 ENCOUNTER — TELEPHONE (OUTPATIENT)
Dept: PULMONOLOGY | Age: 68
End: 2018-09-04

## 2018-09-04 NOTE — TELEPHONE ENCOUNTER
Dorian called back and checked the phone number they had [phone]  ( the correct is [phone])

## 2018-09-04 NOTE — TELEPHONE ENCOUNTER
Patient called back in and was given the phone number for [facility] . He said that he has not had a call from them.

## 2018-09-06 ENCOUNTER — TELEPHONE (OUTPATIENT)
Dept: PULMONOLOGY | Age: 68
End: 2018-09-06

## 2018-10-23 ENCOUNTER — OFFICE VISIT (OUTPATIENT)
Dept: SLEEP MEDICINE | Age: 68
End: 2018-10-23
Payer: MEDICARE

## 2018-10-23 VITALS
BODY MASS INDEX: 47.74 KG/M2 | TEMPERATURE: 97.5 F | OXYGEN SATURATION: 96 % | HEIGHT: 68 IN | WEIGHT: 315 LBS | HEART RATE: 53 BPM | RESPIRATION RATE: 18 BRPM | SYSTOLIC BLOOD PRESSURE: 132 MMHG | DIASTOLIC BLOOD PRESSURE: 72 MMHG

## 2018-10-23 DIAGNOSIS — G47.33 OSA ON CPAP: Primary | ICD-10-CM

## 2018-10-23 DIAGNOSIS — Z99.89 DEPENDENCE ON OTHER ENABLING MACHINES AND DEVICES: ICD-10-CM

## 2018-10-23 DIAGNOSIS — G47.61 PLMD (PERIODIC LIMB MOVEMENT DISORDER): ICD-10-CM

## 2018-10-23 DIAGNOSIS — Z99.89 OSA ON CPAP: Primary | ICD-10-CM

## 2018-10-23 PROCEDURE — G8417 CALC BMI ABV UP PARAM F/U: HCPCS | Performed by: PSYCHIATRY & NEUROLOGY

## 2018-10-23 PROCEDURE — 99213 OFFICE O/P EST LOW 20 MIN: CPT | Performed by: PSYCHIATRY & NEUROLOGY

## 2018-10-23 PROCEDURE — 1036F TOBACCO NON-USER: CPT | Performed by: PSYCHIATRY & NEUROLOGY

## 2018-10-23 PROCEDURE — 4040F PNEUMOC VAC/ADMIN/RCVD: CPT | Performed by: PSYCHIATRY & NEUROLOGY

## 2018-10-23 PROCEDURE — 1123F ACP DISCUSS/DSCN MKR DOCD: CPT | Performed by: PSYCHIATRY & NEUROLOGY

## 2018-10-23 PROCEDURE — 1101F PT FALLS ASSESS-DOCD LE1/YR: CPT | Performed by: PSYCHIATRY & NEUROLOGY

## 2018-10-23 PROCEDURE — 3017F COLORECTAL CA SCREEN DOC REV: CPT | Performed by: PSYCHIATRY & NEUROLOGY

## 2018-10-23 PROCEDURE — G8427 DOCREV CUR MEDS BY ELIG CLIN: HCPCS | Performed by: PSYCHIATRY & NEUROLOGY

## 2018-10-23 PROCEDURE — G8484 FLU IMMUNIZE NO ADMIN: HCPCS | Performed by: PSYCHIATRY & NEUROLOGY

## 2018-10-23 ASSESSMENT — SLEEP AND FATIGUE QUESTIONNAIRES
HOW LIKELY ARE YOU TO NOD OFF OR FALL ASLEEP WHILE SITTING AND TALKING TO SOMEONE: 0
HOW LIKELY ARE YOU TO NOD OFF OR FALL ASLEEP IN A CAR, WHILE STOPPED FOR A FEW MINUTES IN TRAFFIC: 0
HOW LIKELY ARE YOU TO NOD OFF OR FALL ASLEEP WHEN YOU ARE A PASSENGER IN A CAR FOR AN HOUR WITHOUT A BREAK: 0
ESS TOTAL SCORE: 4
HOW LIKELY ARE YOU TO NOD OFF OR FALL ASLEEP WHILE SITTING QUIETLY AFTER LUNCH WITHOUT ALCOHOL: 0
HOW LIKELY ARE YOU TO NOD OFF OR FALL ASLEEP WHILE WATCHING TV: 2
HOW LIKELY ARE YOU TO NOD OFF OR FALL ASLEEP WHILE SITTING INACTIVE IN A PUBLIC PLACE: 0
HOW LIKELY ARE YOU TO NOD OFF OR FALL ASLEEP WHILE LYING DOWN TO REST IN THE AFTERNOON WHEN CIRCUMSTANCES PERMIT: 2
HOW LIKELY ARE YOU TO NOD OFF OR FALL ASLEEP WHILE SITTING AND READING: 0

## 2018-10-23 ASSESSMENT — ENCOUNTER SYMPTOMS
CHOKING: 0
APNEA: 0
GASTROINTESTINAL NEGATIVE: 1
EYES NEGATIVE: 1
ALLERGIC/IMMUNOLOGIC NEGATIVE: 1

## 2018-10-23 NOTE — PROGRESS NOTES
MD THA Booker Board Certified in Sleep Medicine  Certified in [address] [place] [address] Certified in Neurology [address]  [address] [place] [address] [address] [place] [address],  [address]  [phone]   [address] [place] [address], [address]                      [address]  [address] [facility] [id number] [phone]    Subjective:     Patient ID: Moriah So is a 79 y.o. male. Chief Complaint   Patient presents with    Follow-up     cpap       HPI:        Moriah So is a 79 y.o. male was seen today as a follow for obstructive sleep apnea. The patient underwentcomprehensive polysomnogram on 07/26/2018, the overnight registration revealed moderate obstructive sleep apnea with apnea hypopnea index of 19.1/h with lowest O2 saturation of 81%, patient spent about 3.8 minutes below 90%. Subsequently, the patient underwent successful PAP titration on 08/10/2018, the lowest O2 saturation while on PAP was 89%. PLMD with PLM index of 47.6/h and PLM arousal index of 5.5/h during the titration study. Patient is using the PAP machine about 100% of the time, more than 4 hours a nightabout  93 %, in total average of 5:46 hours a night in last 30 days. Currently on PAP at 8 cm, the AHI is only 3.6 events per hour atthis pressure. Patient improved regarding daytime sleepiness and fatigue, wakes up refreshed in the morning. The Patient scored Total score: 4 on [place] Sleepiness Scale ( more than 10 is indicative of daytime sleepiness)   Patient has no problem with PAP pressure or mask. He lost 200 pounds in the last 5 years. DOT/CDL - N/A  Previous Report(s)Reviewed: historical medical records. Social History     Social History    Marital status:       Spouse name: N/A    Number of children: 2    Years of education: N/A     Occupational History    Multiple    WISDOM TOOTH EXTRACTION         Family History   Problem Relation Age of Onset    Diabetes Mother     High Blood Pressure Mother     Heart Disease Mother         Arrhythmia    Stroke Mother     Diabetes Brother     Heart Disease Brother         Arrhythmia    Cancer Maternal Grandmother     Diabetes Paternal Grandmother     Diabetes Brother     Heart Disease Brother         MI    High Blood Pressure Brother        Review of Systems   Constitutional: Negative for fatigue. HENT: Negative. Eyes: Negative. Respiratory: Negative for apnea and choking. Cardiovascular: Positive for leg swelling. Gastrointestinal: Negative. Endocrine: Negative. Genitourinary: Frequency: improved. Musculoskeletal: Positive for arthralgias. Allergic/Immunologic: Negative. Neurological: Negative. Hematological: Negative. Objective:     Vitals:  Weight BMI Neck circumference    Wt Readings from Last 3 Encounters:   10/23/18 (!) 320 lb 12.8 oz (145.5 kg)   07/31/18 (!) 337 lb (152.9 kg)   07/24/18 (!) 338 lb 10 oz (153.6 kg)    Body mass index is 48.78 kg/m². BP HR SaO2   BP Readings from Last 3 Encounters:   10/23/18 132/72   07/31/18 118/68   07/03/18 126/80    Pulse Readings from Last 3 Encounters:   10/23/18 53   07/31/18 57   07/03/18 70    SpO2 Readings from Last 3 Encounters:   10/23/18 96%   07/31/18 99%   07/03/18 97%      Themandibular molar Class :   [x]1 []2 []3      Mallampati I Airway Classification:   []1 []2 [x]3 []4      Physical Exam   Constitutional: No distress. HENT:   Nose: Nose normal.   Eyes: EOM are normal.   Neck: Normal range of motion. Cardiovascular: Normal rate and regular rhythm. Pulmonary/Chest: Effort normal and breath sounds normal.   Musculoskeletal: Normal range of motion. Neurological: He is alert. Skin: Skin is warm. Psychiatric: He has a normal mood and affect. Nursing note and vitals reviewed.       Assessment:   Moderate

## 2018-11-07 ENCOUNTER — TELEPHONE (OUTPATIENT)
Dept: FAMILY MEDICINE CLINIC | Age: 68
End: 2018-11-07

## 2018-11-08 ENCOUNTER — TELEPHONE (OUTPATIENT)
Dept: PULMONOLOGY | Age: 68
End: 2018-11-08

## 2019-01-07 ENCOUNTER — OFFICE VISIT (OUTPATIENT)
Dept: FAMILY MEDICINE CLINIC | Age: 69
End: 2019-01-07
Payer: MEDICARE

## 2019-01-07 ENCOUNTER — HOSPITAL ENCOUNTER (OUTPATIENT)
Dept: GENERAL RADIOLOGY | Age: 69
Discharge: HOME | End: 2019-01-07
Payer: MEDICARE

## 2019-01-07 ENCOUNTER — HOSPITAL ENCOUNTER (OUTPATIENT)
Age: 69
Discharge: HOME | End: 2019-01-07
Payer: MEDICARE

## 2019-01-07 VITALS
BODY MASS INDEX: 45.1 KG/M2 | SYSTOLIC BLOOD PRESSURE: 136 MMHG | WEIGHT: 315 LBS | HEIGHT: 70 IN | DIASTOLIC BLOOD PRESSURE: 82 MMHG

## 2019-01-07 DIAGNOSIS — M62.838 MUSCLE SPASMS OF NECK: ICD-10-CM

## 2019-01-07 DIAGNOSIS — G47.33 OBSTRUCTIVE SLEEP APNEA: ICD-10-CM

## 2019-01-07 DIAGNOSIS — R51.9 OCCIPITAL PAIN: ICD-10-CM

## 2019-01-07 DIAGNOSIS — Z23 NEED FOR INFLUENZA VACCINATION: ICD-10-CM

## 2019-01-07 DIAGNOSIS — I48.20 CHRONIC ATRIAL FIBRILLATION (HCC): Primary | ICD-10-CM

## 2019-01-07 DIAGNOSIS — E66.01 MORBID OBESITY DUE TO EXCESS CALORIES (HCC): ICD-10-CM

## 2019-01-07 PROCEDURE — G8417 CALC BMI ABV UP PARAM F/U: HCPCS | Performed by: FAMILY MEDICINE

## 2019-01-07 PROCEDURE — 90662 IIV NO PRSV INCREASED AG IM: CPT | Performed by: FAMILY MEDICINE

## 2019-01-07 PROCEDURE — G8482 FLU IMMUNIZE ORDER/ADMIN: HCPCS | Performed by: FAMILY MEDICINE

## 2019-01-07 PROCEDURE — G0008 ADMIN INFLUENZA VIRUS VAC: HCPCS | Performed by: FAMILY MEDICINE

## 2019-01-07 PROCEDURE — 3017F COLORECTAL CA SCREEN DOC REV: CPT | Performed by: FAMILY MEDICINE

## 2019-01-07 PROCEDURE — 1123F ACP DISCUSS/DSCN MKR DOCD: CPT | Performed by: FAMILY MEDICINE

## 2019-01-07 PROCEDURE — 72040 X-RAY EXAM NECK SPINE 2-3 VW: CPT

## 2019-01-07 PROCEDURE — G8427 DOCREV CUR MEDS BY ELIG CLIN: HCPCS | Performed by: FAMILY MEDICINE

## 2019-01-07 PROCEDURE — 4040F PNEUMOC VAC/ADMIN/RCVD: CPT | Performed by: FAMILY MEDICINE

## 2019-01-07 PROCEDURE — 1036F TOBACCO NON-USER: CPT | Performed by: FAMILY MEDICINE

## 2019-01-07 PROCEDURE — 1101F PT FALLS ASSESS-DOCD LE1/YR: CPT | Performed by: FAMILY MEDICINE

## 2019-01-07 PROCEDURE — 99214 OFFICE O/P EST MOD 30 MIN: CPT | Performed by: FAMILY MEDICINE

## 2019-01-07 RX ORDER — METHYLPREDNISOLONE 4 MG/1
TABLET ORAL
Qty: 21 TABLET | Refills: 0 | Status: SHIPPED | OUTPATIENT
Start: 2019-01-07 | End: 2019-01-16 | Stop reason: CLARIF

## 2019-01-16 ENCOUNTER — OFFICE VISIT (OUTPATIENT)
Dept: CARDIOLOGY CLINIC | Age: 69
End: 2019-01-16
Payer: MEDICARE

## 2019-01-16 VITALS
HEIGHT: 68 IN | OXYGEN SATURATION: 98 % | BODY MASS INDEX: 47.74 KG/M2 | SYSTOLIC BLOOD PRESSURE: 138 MMHG | HEART RATE: 53 BPM | WEIGHT: 315 LBS | DIASTOLIC BLOOD PRESSURE: 80 MMHG

## 2019-01-16 DIAGNOSIS — I48.0 PAROXYSMAL ATRIAL FIBRILLATION (HCC): ICD-10-CM

## 2019-01-16 DIAGNOSIS — E66.01 MORBID OBESITY DUE TO EXCESS CALORIES (HCC): ICD-10-CM

## 2019-01-16 DIAGNOSIS — I48.0 PAROXYSMAL ATRIAL FIBRILLATION (HCC): Primary | ICD-10-CM

## 2019-01-16 LAB
ANION GAP SERPL CALCULATED.3IONS-SCNC: 16 MMOL/L (ref 3–16)
BUN BLDV-MCNC: 29 MG/DL (ref 7–20)
CALCIUM SERPL-MCNC: 9 MG/DL (ref 8.3–10.6)
CHLORIDE BLD-SCNC: 97 MMOL/L (ref 99–110)
CO2: 25 MMOL/L (ref 21–32)
CREAT SERPL-MCNC: 0.9 MG/DL (ref 0.8–1.3)
GFR AFRICAN AMERICAN: >60
GFR NON-AFRICAN AMERICAN: >60
GLUCOSE BLD-MCNC: 75 MG/DL (ref 70–99)
POTASSIUM SERPL-SCNC: 4.1 MMOL/L (ref 3.5–5.1)
SODIUM BLD-SCNC: 138 MMOL/L (ref 136–145)

## 2019-01-16 PROCEDURE — G8417 CALC BMI ABV UP PARAM F/U: HCPCS | Performed by: INTERNAL MEDICINE

## 2019-01-16 PROCEDURE — G8482 FLU IMMUNIZE ORDER/ADMIN: HCPCS | Performed by: INTERNAL MEDICINE

## 2019-01-16 PROCEDURE — 1101F PT FALLS ASSESS-DOCD LE1/YR: CPT | Performed by: INTERNAL MEDICINE

## 2019-01-16 PROCEDURE — 93000 ELECTROCARDIOGRAM COMPLETE: CPT | Performed by: INTERNAL MEDICINE

## 2019-01-16 PROCEDURE — 3017F COLORECTAL CA SCREEN DOC REV: CPT | Performed by: INTERNAL MEDICINE

## 2019-01-16 PROCEDURE — 4040F PNEUMOC VAC/ADMIN/RCVD: CPT | Performed by: INTERNAL MEDICINE

## 2019-01-16 PROCEDURE — 1036F TOBACCO NON-USER: CPT | Performed by: INTERNAL MEDICINE

## 2019-01-16 PROCEDURE — G8427 DOCREV CUR MEDS BY ELIG CLIN: HCPCS | Performed by: INTERNAL MEDICINE

## 2019-01-16 PROCEDURE — 1123F ACP DISCUSS/DSCN MKR DOCD: CPT | Performed by: INTERNAL MEDICINE

## 2019-01-16 PROCEDURE — 99214 OFFICE O/P EST MOD 30 MIN: CPT | Performed by: INTERNAL MEDICINE

## 2019-01-16 RX ORDER — FUROSEMIDE 40 MG/1
40 TABLET ORAL DAILY
Qty: 90 TABLET | Refills: 0 | Status: SHIPPED | OUTPATIENT
Start: 2019-01-16 | End: 2019-04-14 | Stop reason: SDUPTHER

## 2019-04-15 RX ORDER — FUROSEMIDE 40 MG/1
40 TABLET ORAL DAILY
Qty: 90 TABLET | Refills: 0 | Status: SHIPPED | OUTPATIENT
Start: 2019-04-15 | End: 2019-07-12 | Stop reason: SDUPTHER

## 2019-07-12 RX ORDER — FUROSEMIDE 40 MG/1
40 TABLET ORAL DAILY
Qty: 90 TABLET | Refills: 0 | Status: SHIPPED | OUTPATIENT
Start: 2019-07-12 | End: 2019-10-07 | Stop reason: SDUPTHER

## 2019-07-18 NOTE — PROGRESS NOTES
[address]    Yariel Sat  1950 July 22, 2019    Reason for Consult:  Atrial Fibrilllation    HPI:  The patient is 81 y.o. male with past medical history of paroxsymal atrial fibrillation and morbid obesity. He underwent successful cardioversion on 7/24/18. He presents today for follow up. Today, he reports feeling well overall. He continues to have chronic knee pain and admits to fatigue attributed to this. He also reports occasional lower extremity swelling. He denies any symptoms associated with being in atrial fibrillation and does not have awareness that he is currently in atrial fibrillation. He states he has been using CPAP more consistently and averages 4-5 hours per night of use. He states he has lost some weight but struggles to lose weight due to his knee pain. He was a previous smoker and quit 11 years ago. He admits to gaining some of the weight he had previously lost and attributes this to poor dietary choices and sedentary lifestyle. Review of Systems:  Constitutional: No fatigue, weakness, night sweats or fever. HEENT: No new vision difficulties or ringing in the ears. Respiratory: No new SOB, PND, orthopnea or cough. Cardiovascular: See HPI   GI: No n/v, diarrhea, constipation, abdominal pain or changes in bowel habits. No melena, no hematochezia  : No urinary frequency, urgency, incontinence, hematuria or dysuria. Skin: No cyanosis or skin lesions. Musculoskeletal: No new muscle or joint pain. Neurological: No syncope or TIA-like symptoms.   Psychiatric: No anxiety, insomnia or depression     Past Medical History:   Diagnosis Date    Chronic atrial fibrillation ([facility] [address] [address] 06/21/2018    Dr. Fiorella Van / Cardioversion 7-24-18    Former smoker     Quit 2007    Morbid obesity due to excess calories ([address]     Obstructive sleep apnea 07/2018    CPAP per Dr. Nallely Gordon Pilonidal cyst     Primary osteoarthritis involving multiple joints

## 2019-07-22 ENCOUNTER — OFFICE VISIT (OUTPATIENT)
Dept: CARDIOLOGY CLINIC | Age: 69
End: 2019-07-22
Payer: MEDICARE

## 2019-07-22 VITALS
BODY MASS INDEX: 47.74 KG/M2 | DIASTOLIC BLOOD PRESSURE: 88 MMHG | HEIGHT: 68 IN | OXYGEN SATURATION: 98 % | SYSTOLIC BLOOD PRESSURE: 108 MMHG | HEART RATE: 68 BPM | WEIGHT: 315 LBS

## 2019-07-22 DIAGNOSIS — I48.0 PAROXYSMAL ATRIAL FIBRILLATION (HCC): Primary | ICD-10-CM

## 2019-07-22 DIAGNOSIS — I48.0 PAROXYSMAL ATRIAL FIBRILLATION (HCC): ICD-10-CM

## 2019-07-22 DIAGNOSIS — E66.01 MORBID OBESITY WITH BODY MASS INDEX (BMI) OF 50.0 TO 59.9 IN ADULT (HCC): ICD-10-CM

## 2019-07-22 PROCEDURE — 1123F ACP DISCUSS/DSCN MKR DOCD: CPT | Performed by: INTERNAL MEDICINE

## 2019-07-22 PROCEDURE — 4040F PNEUMOC VAC/ADMIN/RCVD: CPT | Performed by: INTERNAL MEDICINE

## 2019-07-22 PROCEDURE — 1036F TOBACCO NON-USER: CPT | Performed by: INTERNAL MEDICINE

## 2019-07-22 PROCEDURE — G8427 DOCREV CUR MEDS BY ELIG CLIN: HCPCS | Performed by: INTERNAL MEDICINE

## 2019-07-22 PROCEDURE — 99214 OFFICE O/P EST MOD 30 MIN: CPT | Performed by: INTERNAL MEDICINE

## 2019-07-22 PROCEDURE — 3017F COLORECTAL CA SCREEN DOC REV: CPT | Performed by: INTERNAL MEDICINE

## 2019-07-22 PROCEDURE — 93000 ELECTROCARDIOGRAM COMPLETE: CPT | Performed by: INTERNAL MEDICINE

## 2019-07-22 PROCEDURE — G8417 CALC BMI ABV UP PARAM F/U: HCPCS | Performed by: INTERNAL MEDICINE

## 2019-07-22 RX ORDER — AMIODARONE HYDROCHLORIDE 200 MG/1
200 TABLET ORAL 2 TIMES DAILY
Qty: 60 TABLET | Refills: 3 | Status: SHIPPED | OUTPATIENT
Start: 2019-07-22 | End: 2019-07-22 | Stop reason: SDUPTHER

## 2019-07-23 ENCOUNTER — TELEPHONE (OUTPATIENT)
Dept: CARDIOLOGY CLINIC | Age: 69
End: 2019-07-23

## 2019-07-23 RX ORDER — AMIODARONE HYDROCHLORIDE 200 MG/1
TABLET ORAL
Qty: 180 TABLET | Refills: 3 | Status: SHIPPED | OUTPATIENT
Start: 2019-07-23 | End: 2019-08-06 | Stop reason: SDUPTHER

## 2019-07-29 DIAGNOSIS — I48.0 PAROXYSMAL ATRIAL FIBRILLATION (HCC): ICD-10-CM

## 2019-07-29 LAB
ANION GAP SERPL CALCULATED.3IONS-SCNC: 14 MMOL/L (ref 3–16)
BUN BLDV-MCNC: 19 MG/DL (ref 7–20)
CALCIUM SERPL-MCNC: 9.2 MG/DL (ref 8.3–10.6)
CHLORIDE BLD-SCNC: 102 MMOL/L (ref 99–110)
CO2: 27 MMOL/L (ref 21–32)
CREAT SERPL-MCNC: 1 MG/DL (ref 0.8–1.3)
GFR AFRICAN AMERICAN: >60
GFR NON-AFRICAN AMERICAN: >60
GLUCOSE BLD-MCNC: 84 MG/DL (ref 70–99)
HCT VFR BLD CALC: 38.7 % (ref 40.5–52.5)
HEMOGLOBIN: 12.8 G/DL (ref 13.5–17.5)
MCH RBC QN AUTO: 29.4 PG (ref 26–34)
MCHC RBC AUTO-ENTMCNC: 33 G/DL (ref 31–36)
MCV RBC AUTO: 89.1 FL (ref 80–100)
PDW BLD-RTO: 14.2 % (ref 12.4–15.4)
PLATELET # BLD: 231 K/UL (ref 135–450)
PMV BLD AUTO: 9 FL (ref 5–10.5)
POTASSIUM SERPL-SCNC: 4.5 MMOL/L (ref 3.5–5.1)
RBC # BLD: 4.34 M/UL (ref 4.2–5.9)
SODIUM BLD-SCNC: 143 MMOL/L (ref 136–145)
WBC # BLD: 4.6 K/UL (ref 4–11)

## 2019-08-06 ENCOUNTER — HOSPITAL ENCOUNTER (OUTPATIENT)
Dept: CARDIAC CATH/INVASIVE PROCEDURES | Age: 69
Discharge: HOME | End: 2019-08-06
Payer: MEDICARE

## 2019-08-06 VITALS
SYSTOLIC BLOOD PRESSURE: 137 MMHG | BODY MASS INDEX: 47.74 KG/M2 | HEART RATE: 74 BPM | HEIGHT: 68 IN | RESPIRATION RATE: 16 BRPM | WEIGHT: 315 LBS | DIASTOLIC BLOOD PRESSURE: 88 MMHG

## 2019-08-06 DIAGNOSIS — I48.0 PAROXYSMAL ATRIAL FIBRILLATION (HCC): ICD-10-CM

## 2019-08-06 DIAGNOSIS — I48.0 AF (PAROXYSMAL ATRIAL FIBRILLATION) (HCC): ICD-10-CM

## 2019-08-06 PROCEDURE — 93005 ELECTROCARDIOGRAM TRACING: CPT | Performed by: INTERNAL MEDICINE

## 2019-08-06 PROCEDURE — 92960 CARDIOVERSION ELECTRIC EXT: CPT

## 2019-08-06 PROCEDURE — 2500000003 HC RX 250 WO HCPCS

## 2019-08-06 PROCEDURE — 92960 CARDIOVERSION ELECTRIC EXT: CPT | Performed by: INTERNAL MEDICINE

## 2019-08-06 RX ORDER — AMIODARONE HYDROCHLORIDE 200 MG/1
200 TABLET ORAL DAILY
Qty: 180 TABLET | Refills: 3
Start: 2019-08-06 | End: 2019-09-11

## 2019-08-06 RX ORDER — SODIUM CHLORIDE 9 MG/ML
INJECTION, SOLUTION INTRAVENOUS CONTINUOUS
Status: DISCONTINUED | OUTPATIENT
Start: 2019-08-06 | End: 2019-08-07 | Stop reason: HOSPADM

## 2019-08-06 RX ORDER — SODIUM CHLORIDE 0.9 % (FLUSH) 0.9 %
10 SYRINGE (ML) INJECTION PRN
Status: DISCONTINUED | OUTPATIENT
Start: 2019-08-06 | End: 2019-08-07 | Stop reason: HOSPADM

## 2019-08-06 RX ORDER — SODIUM CHLORIDE 0.9 % (FLUSH) 0.9 %
10 SYRINGE (ML) INJECTION EVERY 12 HOURS SCHEDULED
Status: DISCONTINUED | OUTPATIENT
Start: 2019-08-06 | End: 2019-08-07 | Stop reason: HOSPADM

## 2019-08-06 NOTE — PROCEDURES
[facility]           [address]                                 PROCEDURE NOTE    PATIENT NAME: Alize Muniz                    :        1950  MED REC NO:   [id number]                          ROOM:  ACCOUNT NO:   [de-identified]                           ADMIT DATE: 2019  PROVIDER:     Vivian Ingram MD    [email or web address] REPORT    DATE OF PROCEDURE:  2019    INDICATIONS FOR PROCEDURE:  Paroxysmal atrial fibrillation. PROCEDURE:  The risks and benefits of the procedure were explained to  the patient and informed consent was obtained. He was brought to the  catheterization lab recovery area and placed in the supine position. Emergency equipment was in place. He had cardioversion pads placed in  the anterior-posterior distribution. He had an ASA grade of II and a Mallampati score of II. He received  deep sedation with 50 mg of IV Brevital.  When deep sedation was  achieved, a single 200-joule synchronized biphasic cardioversion shock  was performed. This successfully converted the patient from atrial  fibrillation to sinus bradycardia. The patient recovered neurologically with no deficits. Vital signs were  monitored throughout the sedation and there were no complications. ASSESSMENT:  Successful synchronized cardioversion from atrial  fibrillation to sinus bradycardia with first-degree AV block using a  single biphasic cardioversion shock.         Charleen Hurst MD    D: 2019 9:42:13       T: 2019 9:47:32     [email or web address]  Job#: [id number]     Doc#: [id number]    CC:  Antoni Davis DO

## 2019-08-07 LAB
EKG ATRIAL RATE: 54 BPM
EKG DIAGNOSIS: NORMAL
EKG P-R INTERVAL: 246 MS
EKG Q-T INTERVAL: 534 MS
EKG QRS DURATION: 104 MS
EKG QTC CALCULATION (BAZETT): 506 MS
EKG R AXIS: 34 DEGREES
EKG T AXIS: -17 DEGREES
EKG VENTRICULAR RATE: 54 BPM

## 2019-08-07 PROCEDURE — 93010 ELECTROCARDIOGRAM REPORT: CPT | Performed by: INTERNAL MEDICINE

## 2019-08-16 DIAGNOSIS — I48.0 PAROXYSMAL ATRIAL FIBRILLATION (HCC): ICD-10-CM

## 2019-08-16 RX ORDER — RIVAROXABAN 20 MG/1
TABLET, FILM COATED ORAL
Qty: 90 TABLET | Refills: 0 | Status: SHIPPED | OUTPATIENT
Start: 2019-08-16 | End: 2019-11-12 | Stop reason: SDUPTHER

## 2019-09-11 ENCOUNTER — OFFICE VISIT (OUTPATIENT)
Dept: CARDIOLOGY CLINIC | Age: 69
End: 2019-09-11
Payer: MEDICARE

## 2019-09-11 VITALS
HEIGHT: 67 IN | HEART RATE: 55 BPM | DIASTOLIC BLOOD PRESSURE: 74 MMHG | BODY MASS INDEX: 49.44 KG/M2 | SYSTOLIC BLOOD PRESSURE: 122 MMHG | OXYGEN SATURATION: 100 % | WEIGHT: 315 LBS

## 2019-09-11 DIAGNOSIS — G47.33 SLEEP APNEA, OBSTRUCTIVE: ICD-10-CM

## 2019-09-11 DIAGNOSIS — E66.01 MORBID OBESITY WITH BMI OF 50.0-59.9, ADULT (HCC): ICD-10-CM

## 2019-09-11 DIAGNOSIS — Z79.899 ON AMIODARONE THERAPY: ICD-10-CM

## 2019-09-11 DIAGNOSIS — I48.0 PAROXYSMAL ATRIAL FIBRILLATION (HCC): ICD-10-CM

## 2019-09-11 DIAGNOSIS — I48.0 PAROXYSMAL ATRIAL FIBRILLATION (HCC): Primary | ICD-10-CM

## 2019-09-11 LAB
ALBUMIN SERPL-MCNC: 4.3 G/DL (ref 3.4–5)
ALP BLD-CCNC: 70 U/L (ref 40–129)
ALT SERPL-CCNC: 12 U/L (ref 10–40)
AST SERPL-CCNC: 17 U/L (ref 15–37)
BILIRUB SERPL-MCNC: <0.2 MG/DL (ref 0–1)
BILIRUBIN DIRECT: <0.2 MG/DL (ref 0–0.3)
BILIRUBIN, INDIRECT: NORMAL MG/DL (ref 0–1)
TOTAL PROTEIN: 6.9 G/DL (ref 6.4–8.2)
TSH SERPL DL<=0.05 MIU/L-ACNC: 1.61 UIU/ML (ref 0.27–4.2)

## 2019-09-11 PROCEDURE — G8417 CALC BMI ABV UP PARAM F/U: HCPCS | Performed by: NURSE PRACTITIONER

## 2019-09-11 PROCEDURE — G8427 DOCREV CUR MEDS BY ELIG CLIN: HCPCS | Performed by: NURSE PRACTITIONER

## 2019-09-11 PROCEDURE — 99214 OFFICE O/P EST MOD 30 MIN: CPT | Performed by: NURSE PRACTITIONER

## 2019-09-11 PROCEDURE — 1123F ACP DISCUSS/DSCN MKR DOCD: CPT | Performed by: NURSE PRACTITIONER

## 2019-09-11 PROCEDURE — 4040F PNEUMOC VAC/ADMIN/RCVD: CPT | Performed by: NURSE PRACTITIONER

## 2019-09-11 PROCEDURE — 1036F TOBACCO NON-USER: CPT | Performed by: NURSE PRACTITIONER

## 2019-09-11 PROCEDURE — 93000 ELECTROCARDIOGRAM COMPLETE: CPT | Performed by: NURSE PRACTITIONER

## 2019-09-11 PROCEDURE — 3017F COLORECTAL CA SCREEN DOC REV: CPT | Performed by: NURSE PRACTITIONER

## 2019-09-11 RX ORDER — AMIODARONE HYDROCHLORIDE 200 MG/1
TABLET ORAL
Qty: 180 TABLET | Refills: 3
Start: 2019-09-11 | End: 2020-10-27 | Stop reason: SDUPTHER

## 2019-10-08 RX ORDER — FUROSEMIDE 40 MG/1
40 TABLET ORAL DAILY
Qty: 90 TABLET | Refills: 0 | Status: SHIPPED | OUTPATIENT
Start: 2019-10-08 | End: 2020-01-02

## 2019-10-24 ENCOUNTER — PATIENT MESSAGE (OUTPATIENT)
Dept: FAMILY MEDICINE CLINIC | Age: 69
End: 2019-10-24

## 2019-10-29 ENCOUNTER — OFFICE VISIT (OUTPATIENT)
Dept: SLEEP MEDICINE | Age: 69
End: 2019-10-29
Payer: MEDICARE

## 2019-10-29 VITALS
RESPIRATION RATE: 16 BRPM | WEIGHT: 315 LBS | SYSTOLIC BLOOD PRESSURE: 136 MMHG | HEART RATE: 60 BPM | OXYGEN SATURATION: 97 % | BODY MASS INDEX: 49.44 KG/M2 | DIASTOLIC BLOOD PRESSURE: 84 MMHG | HEIGHT: 67 IN

## 2019-10-29 DIAGNOSIS — Z99.89 OSA ON CPAP: Primary | ICD-10-CM

## 2019-10-29 DIAGNOSIS — G47.33 OSA ON CPAP: Primary | ICD-10-CM

## 2019-10-29 DIAGNOSIS — Z99.89 DEPENDENCE ON OTHER ENABLING MACHINES AND DEVICES: ICD-10-CM

## 2019-10-29 PROCEDURE — 1036F TOBACCO NON-USER: CPT | Performed by: PSYCHIATRY & NEUROLOGY

## 2019-10-29 PROCEDURE — 3017F COLORECTAL CA SCREEN DOC REV: CPT | Performed by: PSYCHIATRY & NEUROLOGY

## 2019-10-29 PROCEDURE — G8484 FLU IMMUNIZE NO ADMIN: HCPCS | Performed by: PSYCHIATRY & NEUROLOGY

## 2019-10-29 PROCEDURE — G8417 CALC BMI ABV UP PARAM F/U: HCPCS | Performed by: PSYCHIATRY & NEUROLOGY

## 2019-10-29 PROCEDURE — G8427 DOCREV CUR MEDS BY ELIG CLIN: HCPCS | Performed by: PSYCHIATRY & NEUROLOGY

## 2019-10-29 PROCEDURE — 99213 OFFICE O/P EST LOW 20 MIN: CPT | Performed by: PSYCHIATRY & NEUROLOGY

## 2019-10-29 PROCEDURE — 1123F ACP DISCUSS/DSCN MKR DOCD: CPT | Performed by: PSYCHIATRY & NEUROLOGY

## 2019-10-29 PROCEDURE — 4040F PNEUMOC VAC/ADMIN/RCVD: CPT | Performed by: PSYCHIATRY & NEUROLOGY

## 2019-10-29 ASSESSMENT — SLEEP AND FATIGUE QUESTIONNAIRES
HOW LIKELY ARE YOU TO NOD OFF OR FALL ASLEEP WHILE LYING DOWN TO REST IN THE AFTERNOON WHEN CIRCUMSTANCES PERMIT: 0
HOW LIKELY ARE YOU TO NOD OFF OR FALL ASLEEP WHEN YOU ARE A PASSENGER IN A CAR FOR AN HOUR WITHOUT A BREAK: 0
ESS TOTAL SCORE: 4
HOW LIKELY ARE YOU TO NOD OFF OR FALL ASLEEP WHILE SITTING AND READING: 2
HOW LIKELY ARE YOU TO NOD OFF OR FALL ASLEEP WHILE SITTING INACTIVE IN A PUBLIC PLACE: 0
HOW LIKELY ARE YOU TO NOD OFF OR FALL ASLEEP WHILE SITTING QUIETLY AFTER LUNCH WITHOUT ALCOHOL: 0
HOW LIKELY ARE YOU TO NOD OFF OR FALL ASLEEP WHILE WATCHING TV: 2
HOW LIKELY ARE YOU TO NOD OFF OR FALL ASLEEP IN A CAR, WHILE STOPPED FOR A FEW MINUTES IN TRAFFIC: 0
HOW LIKELY ARE YOU TO NOD OFF OR FALL ASLEEP WHILE SITTING AND TALKING TO SOMEONE: 0

## 2019-10-29 ASSESSMENT — ENCOUNTER SYMPTOMS
CHOKING: 0
EYES NEGATIVE: 1
APNEA: 0

## 2019-11-12 DIAGNOSIS — I48.0 PAROXYSMAL ATRIAL FIBRILLATION (HCC): ICD-10-CM

## 2019-11-12 RX ORDER — RIVAROXABAN 20 MG/1
TABLET, FILM COATED ORAL
Qty: 90 TABLET | Refills: 0 | Status: SHIPPED | OUTPATIENT
Start: 2019-11-12 | End: 2020-01-02

## 2020-01-04 RX ORDER — FUROSEMIDE 40 MG/1
40 TABLET ORAL DAILY
Qty: 90 TABLET | Refills: 3 | Status: SHIPPED | OUTPATIENT
Start: 2020-01-04 | End: 2020-12-24 | Stop reason: SDUPTHER

## 2020-01-04 RX ORDER — RIVAROXABAN 20 MG/1
TABLET, FILM COATED ORAL
Qty: 90 TABLET | Refills: 3 | Status: SHIPPED | OUTPATIENT
Start: 2020-01-04 | End: 2020-12-23 | Stop reason: SDUPTHER

## 2020-07-01 NOTE — PROGRESS NOTES
[facility]    Arthur Lam  1950 July 2, 2020    CC: \"my knees are shot\"     HPI:  The patient is 78 y.o. male with past medical history of paroxsymal atrial fibrillation and morbid obesity. He underwent successful cardioversion on 7/24/18. He presented in atrial fibrillation in office on 7/22/19 and underwent successful cardioversion on 8/6/19. He continued in a regular rhythm in office with Mellisa Murillo, [facility] on 9/11/19. He presents today for follow up. Today, he reports to feeling well overall. He continues to have chronic knee pain and states he cannot undergo replacement surgery due to an open cyst on his side. He has not been able to participate in regular exercise and attributes this to his knee pain. He admits to weight gain as a result of his sedentary lifestyle. At this time he defers referral to the weight loss clinic. He reports to poor sleep and will only use his CPAP about 4 hours per night. He will have to sleep in the recliner a portion of the night due to back pain. He was a previous smoker and quit 11 years ago. Review of Systems:  Constitutional: No fatigue, weakness, night sweats or fever. HEENT: No new vision difficulties or ringing in the ears. Respiratory: No new SOB, PND, orthopnea or cough. Cardiovascular: See HPI   GI: No n/v, diarrhea, constipation, abdominal pain or changes in bowel habits. No melena, no hematochezia  : No urinary frequency, urgency, incontinence, hematuria or dysuria. Skin: No cyanosis or skin lesions. Musculoskeletal: No new muscle or joint pain. Neurological: No syncope or TIA-like symptoms.   Psychiatric: No anxiety, insomnia or depression     Past Medical History:   Diagnosis Date    Chronic atrial fibrillation 06/21/2018    Dr. Figueredo Sat / Cardioversion 7-24-18; DCCV 8/2019    Former smoker     Quit 2007    Morbid obesity due to excess calories ([address]     Obstructive sleep apnea 07/2018    CPAP per Dr. Yash Gtz  Pilonidal cyst     Primary osteoarthritis involving multiple joints      Past Surgical History:   Procedure Laterality Date    AXILLARY SURGERY Right     Cyst Excision    CARDIOVERSION  2018    Dr. Ascencio Fabiana    COLONOSCOPY  10/16/2015    CYST REMOVAL Bilateral     Groin    KNEE ARTHROSCOPY Left     PILONIDAL CYST EXCISION      Multiple    WISDOM TOOTH EXTRACTION       Family History   Problem Relation Age of Onset    Diabetes Mother     High Blood Pressure Mother     Heart Disease Mother         Arrhythmia    Stroke Mother     Diabetes Brother     Heart Disease Brother         Arrhythmia    Cancer Maternal Grandmother     Diabetes Paternal Grandmother     Diabetes Brother     Heart Disease Brother         MI    High Blood Pressure Brother    Mother has atrial fibrillation as well as one of his brothers. One of his brothers passed from a heart attack but was a diabetic with poor control. Social History     Tobacco Use    Smoking status: Former Smoker     Last attempt to quit: 2007     Years since quittin.3    Smokeless tobacco: Never Used   Substance Use Topics    Alcohol use: Yes     Comment: Social; very rare    Drug use: No       No Known Allergies  Current Outpatient Medications   Medication Sig Dispense Refill    furosemide (LASIX) 40 MG tablet TAKE 1 TABLET BY MOUTH DAILY 90 tablet 3    XARELTO 20 MG TABS tablet TAKE 1 TABLET BY MOUTH DAILY WITH BREAKFAST 90 tablet 3    amiodarone (CORDARONE) 200 MG tablet Take 100 mg on Fxn-Elcu-Tyr and 200 mg tablet other days of the week 180 tablet 3     No current facility-administered medications for this visit.       [id number] Score for Atrial Fibrillation Stroke Risk   Risk   Factors  Component Value   C CHF No 0   H HTN No 0   A2 Age >= 75 No,  (79 y.o.) 0   D DM No 0   S2 Prior Stroke/TIA No 0   V Vascular Disease No 0   A Age 74-69 Yes,  (79 y.o.) 1   Sc Sex male 0    [id number]  Score  1   Score last updated 20 0:32 AM EDT    Click here for a link to the UpToDate guideline \"Atrial Fibrillation: Anticoagulation therapy to prevent embolization    Disclaimer: Risk Score calculation is dependent on accuracy of patient problem list and past encounter diagnosis. Physical Exam:   BP (!) 141/81   Pulse 57   Temp 98 °F (36.7 °C)   Ht 5' 7\" (1.702 m)   Wt (!) 389 lb 3.2 oz (176.5 kg) Comment: WITH SHOES  SpO2 96%   BMI 60.96 kg/m²   No intake or output data in the 24 hours ending 07/02/20 0915  Wt Readings from Last 2 Encounters:   07/02/20 (!) 389 lb 3.2 oz (176.5 kg)   10/29/19 (!) 352 lb (159.7 kg)     Constitutional: He is oriented to person, place, and time. He appears morbidly obese. In no acute distress. Head: Normocephalic and atraumatic. Neck: Neck supple. No JVD present. Carotid bruit is not present. No mass and no thyromegaly present. No lymphadenopathy present. Cardiovascular: Irreg irreg, normal heart sounds and intact distal pulses. Exam reveals no gallop and no friction rub. No murmur heard. Pulmonary/Chest: Effort normal and breath sounds normal. No respiratory distress. He has no wheezes, rhonchi or rales. Abdominal: Soft, non-tender. Bowel sounds and aorta are normal. He exhibits no organomegaly, mass or bruit. Extremities: No edema, cyanosis, or clubbing. Pulses are 2+ radial/carotid/dorsalis pedis and posterior tibial bilaterally. Neurological: He is alert and oriented to person, place, and time. He has normal reflexes. No cranial nerve deficit. Coordination normal.   Skin: Skin is warm and dry. There is no rash or diaphoresis. Psychiatric: He has a normal mood and affect.  His speech is normal and behavior is normal.     EKG Interpretation 6/27/18: Atrial fibrillation with RVR, poor R wave progression  EKG Interpretation 1/16/19: Sinus bradycardia with first degree AV block  EKG Interpretation 7/22/19: Atrial fibrillation  EKG Interpretation 7/2/20: Sinus rhythm        Procedures: DCCV 7/24/18  Successful synchronized cardioversion from atrial fibrillation  to a normal sinus rhythm with a single 200 J biphasic synchronized  cardioversion shock. DCCV 8/6/19  Successful synchronized cardioversion from atrial  fibrillation to sinus bradycardia with first-degree AV block using a  single biphasic cardioversion shock. Imaging:     Echo 7/23/18  Normal LV size; Estimated ejection fraction is 45-50%. Cannot comment about  wall motion abn. Left atrium is of normal size. Right ventricular systolic function is normal.       Lab Review:   Lab Results   Component Value Date    TRIG 89 06/21/2018    HDL 57 06/21/2018    LDLCALC 109 06/21/2018    LABVLDL 18 06/21/2018     Lab Results   Component Value Date     07/29/2019    K 4.5 07/29/2019    BUN 19 07/29/2019    CREATININE 1.0 07/29/2019       Assessment:  1. Atrial fibrillation, paroxysmal   2. Morbid obesity   3. DIANE, on CPAP therapy      Plan:  I think that Mr. Giuseppe Vega  is entirely stable from a cardiovascular standpoint. I see no need to make any changes currently in his medical regimen. He remains in a regular rhythm today by EKG. He will continue with Xarelto 20 mg daily for stroke risk reduction. I will have him complete labs including CMP, CBC and TSH given his current medication regimen. I have encouraged him in consistent use of CPAP therapy. I have also discussed a referral to the weight loss clinic but he has he would like to defer at this time. I will see him in the office for follow up in 6 months. This note was scribed in the presence of Jose Guadalupe Ochoa MD by [employer], RN. Physician Attestation:  The scribes documentation has been prepared under my direction and personally reviewed by me in its entirety.      I, Dr. Tyron Dunbar personally performed the services described in this documentation as scribed by my RN,  Krystal Mcmahon in my presence, and I confirm that the note above accurately reflects all

## 2020-07-02 ENCOUNTER — OFFICE VISIT (OUTPATIENT)
Dept: CARDIOLOGY CLINIC | Age: 70
End: 2020-07-02
Payer: MEDICARE

## 2020-07-02 VITALS
SYSTOLIC BLOOD PRESSURE: 141 MMHG | WEIGHT: 315 LBS | OXYGEN SATURATION: 96 % | DIASTOLIC BLOOD PRESSURE: 81 MMHG | TEMPERATURE: 98 F | HEIGHT: 67 IN | BODY MASS INDEX: 49.44 KG/M2 | HEART RATE: 57 BPM

## 2020-07-02 DIAGNOSIS — I48.0 PAROXYSMAL ATRIAL FIBRILLATION (HCC): ICD-10-CM

## 2020-07-02 LAB
A/G RATIO: 1.6 (ref 1.1–2.2)
ALBUMIN SERPL-MCNC: 4.2 G/DL (ref 3.4–5)
ALP BLD-CCNC: 76 U/L (ref 40–129)
ALT SERPL-CCNC: 11 U/L (ref 10–40)
ANION GAP SERPL CALCULATED.3IONS-SCNC: 13 MMOL/L (ref 3–16)
AST SERPL-CCNC: 15 U/L (ref 15–37)
BILIRUB SERPL-MCNC: 0.3 MG/DL (ref 0–1)
BUN BLDV-MCNC: 29 MG/DL (ref 7–20)
CALCIUM SERPL-MCNC: 8.7 MG/DL (ref 8.3–10.6)
CHLORIDE BLD-SCNC: 103 MMOL/L (ref 99–110)
CO2: 24 MMOL/L (ref 21–32)
CREAT SERPL-MCNC: 1.1 MG/DL (ref 0.8–1.3)
GFR AFRICAN AMERICAN: >60
GFR NON-AFRICAN AMERICAN: >60
GLOBULIN: 2.6 G/DL
GLUCOSE BLD-MCNC: 93 MG/DL (ref 70–99)
HCT VFR BLD CALC: 37.3 % (ref 40.5–52.5)
HEMOGLOBIN: 12.2 G/DL (ref 13.5–17.5)
MCH RBC QN AUTO: 29.6 PG (ref 26–34)
MCHC RBC AUTO-ENTMCNC: 32.7 G/DL (ref 31–36)
MCV RBC AUTO: 90.5 FL (ref 80–100)
PDW BLD-RTO: 13.2 % (ref 12.4–15.4)
PLATELET # BLD: 210 K/UL (ref 135–450)
PMV BLD AUTO: 8.1 FL (ref 5–10.5)
POTASSIUM SERPL-SCNC: 4.2 MMOL/L (ref 3.5–5.1)
RBC # BLD: 4.13 M/UL (ref 4.2–5.9)
SODIUM BLD-SCNC: 140 MMOL/L (ref 136–145)
TOTAL PROTEIN: 6.8 G/DL (ref 6.4–8.2)
TSH REFLEX: 1.42 UIU/ML (ref 0.27–4.2)
WBC # BLD: 5.6 K/UL (ref 4–11)

## 2020-07-02 PROCEDURE — 1036F TOBACCO NON-USER: CPT | Performed by: INTERNAL MEDICINE

## 2020-07-02 PROCEDURE — 99214 OFFICE O/P EST MOD 30 MIN: CPT | Performed by: INTERNAL MEDICINE

## 2020-07-02 PROCEDURE — G8417 CALC BMI ABV UP PARAM F/U: HCPCS | Performed by: INTERNAL MEDICINE

## 2020-07-02 PROCEDURE — 1123F ACP DISCUSS/DSCN MKR DOCD: CPT | Performed by: INTERNAL MEDICINE

## 2020-07-02 PROCEDURE — 93000 ELECTROCARDIOGRAM COMPLETE: CPT | Performed by: INTERNAL MEDICINE

## 2020-07-02 PROCEDURE — G8427 DOCREV CUR MEDS BY ELIG CLIN: HCPCS | Performed by: INTERNAL MEDICINE

## 2020-07-02 PROCEDURE — 4040F PNEUMOC VAC/ADMIN/RCVD: CPT | Performed by: INTERNAL MEDICINE

## 2020-07-02 PROCEDURE — 3017F COLORECTAL CA SCREEN DOC REV: CPT | Performed by: INTERNAL MEDICINE

## 2020-07-02 NOTE — PATIENT INSTRUCTIONS
Patient Education        Atrial Fibrillation: Care Instructions  Your Care Instructions     Atrial fibrillation is an irregular and often fast heartbeat. Treating this condition is important for several reasons. It can cause blood clots, which can travel from your heart to your brain and cause a stroke. If you have a fast heartbeat, you may feel lightheaded, dizzy, and weak. An irregular heartbeat can also increase your risk for heart failure. Atrial fibrillation is often the result of another heart condition, such as high blood pressure or coronary artery disease. Making changes to improve your heart condition will help you stay healthy and active. Follow-up care is a key part of your treatment and safety. Be sure to make and go to all appointments, and call your doctor if you are having problems. It's also a good idea to know your test results and keep a list of the medicines you take. How can you care for yourself at home? Medicines  · Take your medicines exactly as prescribed. Call your doctor if you think you are having a problem with your medicine. You will get more details on the specific medicines your doctor prescribes. · If your doctor has given you a blood thinner to prevent a stroke, be sure you get instructions about how to take your medicine safely. Blood thinners can cause serious bleeding problems. · Do not take any vitamins, over-the-counter drugs, or herbal products without talking to your doctor first.  Lifestyle changes  · Do not smoke. Smoking can increase your chance of a stroke and heart attack. If you need help quitting, talk to your doctor about stop-smoking programs and medicines. These can increase your chances of quitting for good. · Eat a heart-healthy diet. · Stay at a healthy weight. Lose weight if you need to. · Limit alcohol to 2 drinks a day for men and 1 drink a day for women. Too much alcohol can cause health problems. · Avoid colds and flu.  Get a pneumococcal vaccine include:  ? Sudden numbness, tingling, weakness, or loss of movement in your face, arm, or leg, especially on only one side of your body. ? Sudden vision changes. ? Sudden trouble speaking. ? Sudden confusion or trouble understanding simple statements. ? Sudden problems with walking or balance. ? A sudden, severe headache that is different from past headaches. · You passed out (lost consciousness). Call your doctor now or seek immediate medical care if:  · You have new or increased shortness of breath. · You feel dizzy or lightheaded, or you feel like you may faint. · Your heart rate becomes irregular. · You can feel your heart flutter in your chest or skip heartbeats. Tell your doctor if these symptoms are new or worse. Watch closely for changes in your health, and be sure to contact your doctor if you have any problems. Where can you learn more? Go to [email or web address]. org and sign in to your [employer] account. Enter U020 in the [employer] box to learn more about \"Atrial Fibrillation: Care Instructions. \"     If you do not have an account, please click on the \"Sign Up Now\" link. Current as of: December 16, 2019               Content Version: 12.5  © 4853-6939 Healthwise, Incorporated. Care instructions adapted under license by [facility] ([facility]). If you have questions about a medical condition or this instruction, always ask your healthcare professional. [place] [address] any warranty or liability for your use of this information.

## 2020-07-16 ENCOUNTER — E-VISIT (OUTPATIENT)
Dept: FAMILY MEDICINE CLINIC | Age: 70
End: 2020-07-16
Payer: MEDICARE

## 2020-07-16 PROCEDURE — 99421 OL DIG E/M SVC 5-10 MIN: CPT | Performed by: FAMILY MEDICINE

## 2020-07-16 RX ORDER — DIPHENOXYLATE HYDROCHLORIDE AND ATROPINE SULFATE 2.5; .025 MG/1; MG/1
1 TABLET ORAL 4 TIMES DAILY PRN
Qty: 20 TABLET | Refills: 0 | Status: SHIPPED | OUTPATIENT
Start: 2020-07-16 | End: 2020-07-21

## 2020-07-16 NOTE — PROGRESS NOTES
Patient started an E-Visit for a 2 day history of diarrhea occurring 1-4 times daily with no nausea or vomiting or abdominal pain. I have prescribed Lomotil prn. If no better in 3-5 days, he should be seen in the office.

## 2020-07-22 ENCOUNTER — TELEPHONE (OUTPATIENT)
Dept: FAMILY MEDICINE CLINIC | Age: 70
End: 2020-07-22

## 2020-07-26 ASSESSMENT — ENCOUNTER SYMPTOMS
NAUSEA: 0
CONSTIPATION: 0
DIARRHEA: 1
VOMITING: 0
ABDOMINAL PAIN: 0

## 2020-07-26 NOTE — PROGRESS NOTES
Subjective:      Patient ID: Elías Tapia is a 78 y.o. male. HPI     Chronic Atrial Fibrillation:  Patient was found by me to have atrial fibrillation on 6-21-18. He was started on Eliquis and was referred to Dr. Mary Jo Charles. He had cardioversion done on 7-24-18. He is now on Xarelto 20 mg daily, Amiodarone 100 mg on M-W-F and 200 mg daily the other days, and Lasix 40 mg daily. He remains in NSR. He will follow up with Dr. Mary Jo Charles next week.       Sleep Apnea:  Patient is tolerating and compliant with CPAP per Dr. Carne Kumari. He is sleeping well and denies daytime somnolence.       Obesity:  Patient weighed 338 at his visit on 1-7-19. He has gained 43 lbs since then. Diarrhea:  Patient had an E-visit on 7-16-20 with a 2 day history of diarrhea occurring 1-4 times daily with no nausea or vomiting or abdominal pain. He was prescribed Lomotil prn. He returns to the office today with continued diarrhea. It has improved some but he has already had 4 episodes today. Review of Systems   Constitutional: Negative for chills and fever. Gastrointestinal: Positive for diarrhea. Negative for abdominal pain, constipation, nausea and vomiting. /82   Temp 97.1 °F (36.2 °C)   Ht 5' 7\" (1.702 m)   Wt (!) 381 lb 12.8 oz (173.2 kg)   BMI 59.80 kg/m²    Objective:   Physical Exam  Vitals signs reviewed. Constitutional:       General: He is not in acute distress. Appearance: He is well-developed. HENT:      Head: Normocephalic. Right Ear: External ear normal.      Left Ear: External ear normal.   Neck:      Thyroid: No thyromegaly. Vascular: No carotid bruit or JVD. Cardiovascular:      Rate and Rhythm: Normal rate and regular rhythm. Heart sounds: Normal heart sounds. No murmur. Pulmonary:      Effort: Pulmonary effort is normal.      Breath sounds: Normal breath sounds. No wheezing or rales. Abdominal:      General: Bowel sounds are normal. There is no distension. Palpations: Abdomen is soft. There is no mass. Tenderness: There is no abdominal tenderness. There is no guarding or rebound. Hernia: No hernia is present. Comments: Morbidly obese. Lymphadenopathy:      Cervical: No cervical adenopathy. Neurological:      Mental Status: He is alert and oriented to person, place, and time. Assessment:      Chronic Atrial Fibrillation   Obstructive Sleep Apnea  Obesity  Diarrhea       Plan:      Stool for C&S, O&P, C. Difficile Toxin  Continue Probiotic and Imodium as needed   Continue current medication   Pneumovax Shot Given  Recommended that patient have an AWV with our nurse.      RTO as needed         Rebekah Garcia,

## 2020-07-27 ENCOUNTER — OFFICE VISIT (OUTPATIENT)
Dept: FAMILY MEDICINE CLINIC | Age: 70
End: 2020-07-27
Payer: MEDICARE

## 2020-07-27 VITALS
TEMPERATURE: 97.1 F | DIASTOLIC BLOOD PRESSURE: 82 MMHG | BODY MASS INDEX: 49.44 KG/M2 | SYSTOLIC BLOOD PRESSURE: 136 MMHG | WEIGHT: 315 LBS | HEIGHT: 67 IN

## 2020-07-27 PROCEDURE — 90732 PPSV23 VACC 2 YRS+ SUBQ/IM: CPT | Performed by: FAMILY MEDICINE

## 2020-07-27 PROCEDURE — G8417 CALC BMI ABV UP PARAM F/U: HCPCS | Performed by: FAMILY MEDICINE

## 2020-07-27 PROCEDURE — G8427 DOCREV CUR MEDS BY ELIG CLIN: HCPCS | Performed by: FAMILY MEDICINE

## 2020-07-27 PROCEDURE — 4040F PNEUMOC VAC/ADMIN/RCVD: CPT | Performed by: FAMILY MEDICINE

## 2020-07-27 PROCEDURE — 99214 OFFICE O/P EST MOD 30 MIN: CPT | Performed by: FAMILY MEDICINE

## 2020-07-27 PROCEDURE — 3017F COLORECTAL CA SCREEN DOC REV: CPT | Performed by: FAMILY MEDICINE

## 2020-07-27 PROCEDURE — 1036F TOBACCO NON-USER: CPT | Performed by: FAMILY MEDICINE

## 2020-07-27 PROCEDURE — G0009 ADMIN PNEUMOCOCCAL VACCINE: HCPCS | Performed by: FAMILY MEDICINE

## 2020-07-27 PROCEDURE — 1123F ACP DISCUSS/DSCN MKR DOCD: CPT | Performed by: FAMILY MEDICINE

## 2020-07-28 LAB — C DIFF TOXIN/ANTIGEN: NORMAL

## 2020-07-29 LAB
CRYPTOSPORIDIUM ANTIGEN STOOL: NORMAL
E HISTOLYTICA ANTIGEN STOOL: NORMAL
GI BACTERIAL PATHOGENS BY PCR: NORMAL
GIARDIA ANTIGEN STOOL: NORMAL

## 2020-09-14 ENCOUNTER — OFFICE VISIT (OUTPATIENT)
Dept: FAMILY MEDICINE CLINIC | Age: 70
End: 2020-09-14
Payer: MEDICARE

## 2020-09-14 VITALS
HEIGHT: 67 IN | BODY MASS INDEX: 49.44 KG/M2 | DIASTOLIC BLOOD PRESSURE: 74 MMHG | TEMPERATURE: 98.1 F | SYSTOLIC BLOOD PRESSURE: 136 MMHG | WEIGHT: 315 LBS

## 2020-09-14 PROCEDURE — G8510 SCR DEP NEG, NO PLAN REQD: HCPCS | Performed by: FAMILY MEDICINE

## 2020-09-14 PROCEDURE — 4040F PNEUMOC VAC/ADMIN/RCVD: CPT | Performed by: FAMILY MEDICINE

## 2020-09-14 PROCEDURE — G8417 CALC BMI ABV UP PARAM F/U: HCPCS | Performed by: FAMILY MEDICINE

## 2020-09-14 PROCEDURE — 3017F COLORECTAL CA SCREEN DOC REV: CPT | Performed by: FAMILY MEDICINE

## 2020-09-14 PROCEDURE — 99213 OFFICE O/P EST LOW 20 MIN: CPT | Performed by: FAMILY MEDICINE

## 2020-09-14 PROCEDURE — G0008 ADMIN INFLUENZA VIRUS VAC: HCPCS | Performed by: FAMILY MEDICINE

## 2020-09-14 PROCEDURE — 1123F ACP DISCUSS/DSCN MKR DOCD: CPT | Performed by: FAMILY MEDICINE

## 2020-09-14 PROCEDURE — 90694 VACC AIIV4 NO PRSRV 0.5ML IM: CPT | Performed by: FAMILY MEDICINE

## 2020-09-14 PROCEDURE — 1036F TOBACCO NON-USER: CPT | Performed by: FAMILY MEDICINE

## 2020-09-14 PROCEDURE — G8427 DOCREV CUR MEDS BY ELIG CLIN: HCPCS | Performed by: FAMILY MEDICINE

## 2020-09-14 RX ORDER — SULFAMETHOXAZOLE AND TRIMETHOPRIM 800; 160 MG/1; MG/1
1 TABLET ORAL 2 TIMES DAILY
Qty: 20 TABLET | Refills: 0 | Status: SHIPPED | OUTPATIENT
Start: 2020-09-14 | End: 2020-10-07 | Stop reason: SDUPTHER

## 2020-09-14 ASSESSMENT — PATIENT HEALTH QUESTIONNAIRE - PHQ9
1. LITTLE INTEREST OR PLEASURE IN DOING THINGS: 0
SUM OF ALL RESPONSES TO PHQ QUESTIONS 1-9: 0
SUM OF ALL RESPONSES TO PHQ9 QUESTIONS 1 & 2: 0
2. FEELING DOWN, DEPRESSED OR HOPELESS: 0
SUM OF ALL RESPONSES TO PHQ QUESTIONS 1-9: 0

## 2020-09-14 NOTE — PROGRESS NOTES
Vaccine Information Sheet, \"Influenza - Inactivated\"  given to Megan King, or parent/legal guardian of  Megan King and verbalized understanding. Patient responses:    Have you ever had a reaction to a flu vaccine? No  Do you have any current illness? No  Have you ever had Guillian Commiskey Syndrome? No  Do you have a serious allergy to any of the follow: Neomycin, Polymyxin, Thimerosal, eggs or egg products? No    Flu vaccine given per order. Please see immunization tab. Risks and benefits explained. Current VIS given.

## 2020-10-07 ENCOUNTER — OFFICE VISIT (OUTPATIENT)
Dept: FAMILY MEDICINE CLINIC | Age: 70
End: 2020-10-07
Payer: MEDICARE

## 2020-10-07 VITALS — DIASTOLIC BLOOD PRESSURE: 80 MMHG | HEIGHT: 67 IN | SYSTOLIC BLOOD PRESSURE: 130 MMHG | BODY MASS INDEX: 55.91 KG/M2

## 2020-10-07 PROCEDURE — G8427 DOCREV CUR MEDS BY ELIG CLIN: HCPCS | Performed by: FAMILY MEDICINE

## 2020-10-07 PROCEDURE — G8417 CALC BMI ABV UP PARAM F/U: HCPCS | Performed by: FAMILY MEDICINE

## 2020-10-07 PROCEDURE — 3017F COLORECTAL CA SCREEN DOC REV: CPT | Performed by: FAMILY MEDICINE

## 2020-10-07 PROCEDURE — 1036F TOBACCO NON-USER: CPT | Performed by: FAMILY MEDICINE

## 2020-10-07 PROCEDURE — G8484 FLU IMMUNIZE NO ADMIN: HCPCS | Performed by: FAMILY MEDICINE

## 2020-10-07 PROCEDURE — 1123F ACP DISCUSS/DSCN MKR DOCD: CPT | Performed by: FAMILY MEDICINE

## 2020-10-07 PROCEDURE — 99213 OFFICE O/P EST LOW 20 MIN: CPT | Performed by: FAMILY MEDICINE

## 2020-10-07 PROCEDURE — 4040F PNEUMOC VAC/ADMIN/RCVD: CPT | Performed by: FAMILY MEDICINE

## 2020-10-07 RX ORDER — SULFAMETHOXAZOLE AND TRIMETHOPRIM 800; 160 MG/1; MG/1
1 TABLET ORAL 2 TIMES DAILY
Qty: 20 TABLET | Refills: 0 | Status: SHIPPED | OUTPATIENT
Start: 2020-10-07 | End: 2020-10-17

## 2020-10-07 NOTE — PROGRESS NOTES
Subjective:      Patient ID: Stephanie Mustafa is a 78 y.o. male. HPI     Cyst on Buttock:  Patient has had a pilonidal cyst on the buttock for years. He has had 3 prior surgeries for this. He states that over the past 3 days, he has developed some bleeding from the site. He denies any pain or puss from it. Wt Readings from Last 3 Encounters:   09/14/20 (!) 357 lb (161.9 kg)   07/27/20 (!) 381 lb 12.8 oz (173.2 kg)   07/02/20 (!) 389 lb 3.2 oz (176.5 kg)       Review of Systems   Constitutional: Negative for chills and fever. Skin: Positive for wound. /80 (Cuff Size: Large Adult)   Ht 5' 7\" (1.702 m)   BMI 55.91 kg/m²    Objective:   Physical Exam  Constitutional:       General: He is not in acute distress. Appearance: Normal appearance. He is obese. He is not ill-appearing or toxic-appearing. Genitourinary:     Comments: There is a small pore at the site of his pilonidal cyst with a scant amount of blood leaking from it. Neurological:      Mental Status: He is alert and oriented to person, place, and time. Assessment:      Pilonidal Cyst       Plan:      Rx Bactrim DS 1 BID for 10 days. Recommended that patient have an AWV with our nurse.     RTO as needed         [id number] Jessie Ortiz DO

## 2020-10-27 ENCOUNTER — OFFICE VISIT (OUTPATIENT)
Dept: SLEEP MEDICINE | Age: 70
End: 2020-10-27
Payer: MEDICARE

## 2020-10-27 VITALS
RESPIRATION RATE: 16 BRPM | DIASTOLIC BLOOD PRESSURE: 80 MMHG | WEIGHT: 315 LBS | TEMPERATURE: 98.3 F | SYSTOLIC BLOOD PRESSURE: 128 MMHG | HEIGHT: 68 IN | HEART RATE: 58 BPM | OXYGEN SATURATION: 96 % | BODY MASS INDEX: 47.74 KG/M2

## 2020-10-27 PROCEDURE — G8417 CALC BMI ABV UP PARAM F/U: HCPCS | Performed by: PSYCHIATRY & NEUROLOGY

## 2020-10-27 PROCEDURE — G8484 FLU IMMUNIZE NO ADMIN: HCPCS | Performed by: PSYCHIATRY & NEUROLOGY

## 2020-10-27 PROCEDURE — 4040F PNEUMOC VAC/ADMIN/RCVD: CPT | Performed by: PSYCHIATRY & NEUROLOGY

## 2020-10-27 PROCEDURE — 99213 OFFICE O/P EST LOW 20 MIN: CPT | Performed by: PSYCHIATRY & NEUROLOGY

## 2020-10-27 PROCEDURE — G8427 DOCREV CUR MEDS BY ELIG CLIN: HCPCS | Performed by: PSYCHIATRY & NEUROLOGY

## 2020-10-27 PROCEDURE — 3017F COLORECTAL CA SCREEN DOC REV: CPT | Performed by: PSYCHIATRY & NEUROLOGY

## 2020-10-27 PROCEDURE — 1036F TOBACCO NON-USER: CPT | Performed by: PSYCHIATRY & NEUROLOGY

## 2020-10-27 PROCEDURE — 1123F ACP DISCUSS/DSCN MKR DOCD: CPT | Performed by: PSYCHIATRY & NEUROLOGY

## 2020-10-27 RX ORDER — AMIODARONE HYDROCHLORIDE 200 MG/1
TABLET ORAL
Qty: 180 TABLET | Refills: 3 | OUTPATIENT
Start: 2020-10-27

## 2020-10-27 RX ORDER — AMIODARONE HYDROCHLORIDE 200 MG/1
TABLET ORAL
Qty: 90 TABLET | Refills: 3 | Status: SHIPPED | OUTPATIENT
Start: 2020-10-27 | End: 2021-01-07 | Stop reason: SDUPTHER

## 2020-10-27 ASSESSMENT — SLEEP AND FATIGUE QUESTIONNAIRES
HOW LIKELY ARE YOU TO NOD OFF OR FALL ASLEEP WHILE SITTING QUIETLY AFTER LUNCH WITHOUT ALCOHOL: 1
HOW LIKELY ARE YOU TO NOD OFF OR FALL ASLEEP WHEN YOU ARE A PASSENGER IN A CAR FOR AN HOUR WITHOUT A BREAK: 1
HOW LIKELY ARE YOU TO NOD OFF OR FALL ASLEEP WHILE SITTING INACTIVE IN A PUBLIC PLACE: 1
HOW LIKELY ARE YOU TO NOD OFF OR FALL ASLEEP WHILE LYING DOWN TO REST IN THE AFTERNOON WHEN CIRCUMSTANCES PERMIT: 2
ESS TOTAL SCORE: 8
HOW LIKELY ARE YOU TO NOD OFF OR FALL ASLEEP IN A CAR, WHILE STOPPED FOR A FEW MINUTES IN TRAFFIC: 0
HOW LIKELY ARE YOU TO NOD OFF OR FALL ASLEEP WHILE WATCHING TV: 2
HOW LIKELY ARE YOU TO NOD OFF OR FALL ASLEEP WHILE SITTING AND TALKING TO SOMEONE: 0
HOW LIKELY ARE YOU TO NOD OFF OR FALL ASLEEP WHILE SITTING AND READING: 1

## 2020-10-27 ASSESSMENT — ENCOUNTER SYMPTOMS: APNEA: 0

## 2020-10-27 NOTE — PROGRESS NOTES
Taran Nix         : 1950        PHONE: [phone] (home)     Diagnosis: [x] DIANE (G47.33) [] CSA (G47.31) [] Apnea (G47.30)   Length of Need: [] 12 Months [x] 99 Months [] Other:    Machine (CHEY!): [] Respironics Dream Station      Auto [] ResMed AirSense     Auto [] Other:     []  CPAP () [] Bilevel ()   Mode: [] Auto [] Spontaneous    Mode: [] Auto [] Spontaneous                                 Humidifier: [] Heated ()        [x] Water chamber replacement ()/ 1 per 6 months        Mask:   [x] Nasal () /1 per 3 months [] Full Face () /1 per 3 months   [x] Patient choice -Size and fit mask [] Patient Choice - Size and fit mask   [x] Dispense:  [] Dispense:    [] Headgear () / 1 per 3 months [] Headgear () / 1 per 3 months   [x] Replacement Nasal Cushion ()/2 per month [] Interface Replacement ()/1 per month   [x] Replacement Nasal Pillows ()/2 per month         Tubing: [x] Heated ()/1 per 3 months    [] Standard ()/1 per 3 months [] Other:           Filters: [x] Non-disposable ()/1 per 6 months     [x] Ultra-Fine, Disposable ()/2 per month        Miscellaneous: [] Chin Strap ()/ 1 per 6 months [] O2 bleed-in:       LPM   [] Oximetry on CPAP/Bilevel []  Other:          Start Order Date: 10/27/20    MEDICAL JUSTIFICATION:  I, the undersigned, certify that the above prescribed supplies are medically necessary for this patients wellbeing. In my opinion, the supplies are both reasonable and necessary in reference to accepted standards of medicalpractice in treatment of this patients condition.     Mirella Ribera MD      NPI: [id number]       Order Signed Date: 10/27/20    Electronically signed by Mirella Ribera MD on 10/27/2020 at 9:31 AM

## 2020-10-27 NOTE — PATIENT INSTRUCTIONS
Patient Education        Learning About CPAP for Sleep Apnea  What is CPAP? CPAP is a small machine that you use at home every night while you sleep. It increases air pressure in your throat to keep your airway open. When you have sleep apnea, this can help you sleep better so you feel much better. CPAP stands for \"continuous positive airway pressure. \"  The CPAP machine will have one of the following:  · A mask that covers your nose and mouth  · Prongs that fit into your nose  · A mask that covers your nose only, which is the most common type. This type is called NCPAP. The N stands for \"nasal.\"  Why is it done? CPAP is usually the best treatment for obstructive sleep apnea. It is the first treatment choice and the most widely used. CPAP:  · Helps you have more normal sleep, so you feel less sleepy and more alert during the daytime. · May help keep heart failure or other heart problems from getting worse. · May help lower your blood pressure. If you use CPAP, your bed partner may also sleep better. That's because you aren't snoring or restless. Your doctor may suggest CPAP if you have:  · Moderate to severe sleep apnea. · Sleep apnea and coronary artery disease (CAD). · Sleep apnea and heart failure. What are the side effects? Some people who use CPAP have:  · A dry or stuffy nose and a sore throat. · Irritated skin on the face. · Sore eyes. · Bloating. How can you care for yourself? If using CPAP is not comfortable, or if you have certain side effects, work with your doctor to fix them. Here are some things you can try:  · Be sure the mask or nasal prongs fit well. · See if your doctor can adjust the pressure of your CPAP. · If your nose is dry, try a humidifier. · If your nose is runny or stuffy, try decongestant medicine or a steroid nasal spray. Be safe with medicines. Read and follow all instructions on the label. Do not use the medicine longer than the label says.   If these things don't help, you might try a different type of machine. Some machines have air pressure that adjusts on its own. Others have air pressures that are different when you breathe in than when you breathe out. This may reduce discomfort caused by too much pressure in your nose. Where can you learn more? Go to https://chpepiceweb.[employer]. org and sign in to your [employer] account. Enter Z439 in the Mineralist box to learn more about \"Learning About CPAP for Sleep Apnea. \"     If you do not have an account, please click on the \"Sign Up Now\" link. Current as of: February 24, 2020               Content Version: 12.6  © 2006-2020 [employer]. Care instructions adapted under license by [facility] ([facility]). If you have questions about a medical condition or this instruction, always ask your healthcare professional. [address] any warranty or liability for your use of this information.

## 2020-10-27 NOTE — PROGRESS NOTES
MD THA Gordon Board Certified in Sleep Medicine  Certified in [address] [place] [address] Certified in Neurology [address]  [address] [facility] [address], [address], [address]  [phone]   [address] [place] [address], [address] [facility] [address]                      [address]  [address] [facility] [id number] [phone]    Subjective:     Patient ID: Eileen Ramos is a 78 y.o. male. Chief Complaint   Patient presents with    Sleep Apnea     CPAP F/U       HPI:        Eileen Ramos is a 78 y.o. male was seen today as a follow for moderate obstructive sleep apnea with apnea hypopnea index of 19.1/h with lowest O2 saturation of 81%, patient spent about 3.8 minutes below 90%. Patient is using the PAP machine about 100% of the time, more than 4 hours a nightabout  94 %, in total average of 5:26 hours a night in last 90 days. Currently on PAP at 8 cm (), the AHI is only 3.5 events per hour at this pressure. Patient improved regarding daytime sleepiness and fatigue, wakes up refreshed in the morning. The Patient scored Total score: 8 on [place] Sleepiness Scale ( more than 10 is indicative of daytime sleepiness)   Patient has no problem with PAP pressure or mask. Uses nasal pillow mask. Lost 13 pounds since last visit. DOT/CDL - N/A        Previous Report(s)Reviewed: historical medical records         Social History     Socioeconomic History    Marital status:       Spouse name: Not on file    Number of children: 2    Years of education: Not on file    Highest education level: Not on file   Occupational History    Occupation: Retired [address] Financial resource strain: Not on file    Food insecurity     Worry: Not on file     Inability: Not on file   CDNlion needs     Medical: Not on file     Non-medical: Not on file   Tobacco DCCV 8/2019    Former smoker     Quit 2007    Morbid obesity due to excess calories ([facility] [address]     Obstructive sleep apnea 07/2018    CPAP per Dr. Elbert Marr Pilonidal cyst     Primary osteoarthritis involving multiple joints        Past Surgical History:   Procedure Laterality Date    AXILLARY SURGERY Right     Cyst Excision    CARDIOVERSION  07/24/2018    Dr. Kyle Bob    COLONOSCOPY  10/16/2015    CYST REMOVAL Bilateral     Groin    KNEE ARTHROSCOPY Left     PILONIDAL CYST EXCISION      Multiple    WISDOM TOOTH EXTRACTION         Family History   Problem Relation Age of Onset    Diabetes Mother     High Blood Pressure Mother     Heart Disease Mother         Arrhythmia    Stroke Mother     Diabetes Brother     Heart Disease Brother         Arrhythmia    Cancer Maternal Grandmother     Diabetes Paternal Grandmother     Diabetes Brother     Heart Disease Brother         MI    High Blood Pressure Brother        Review of Systems   Constitutional: Negative for fatigue. Respiratory: Negative for apnea. Genitourinary: Negative for frequency. Neurological: Negative for headaches. Objective:     Vitals:  Weight BMI Neck circumference    Wt Readings from Last 3 Encounters:   10/27/20 (!) 339 lb (153.8 kg)   09/14/20 (!) 357 lb (161.9 kg)   07/27/20 (!) 381 lb 12.8 oz (173.2 kg)    Body mass index is 51.54 kg/m². BP HR SaO2   BP Readings from Last 3 Encounters:   10/27/20 128/80   10/07/20 130/80   09/14/20 136/74    Pulse Readings from Last 3 Encounters:   10/27/20 58   07/02/20 57   10/29/19 60    SpO2 Readings from Last 3 Encounters:   10/27/20 96%   07/02/20 96%   10/29/19 97%        Themandibular molar Class :   [x]1 []2 []3      Mallampati I Airway Classification:   []1 []2 [x]3 []4      Physical Exam  Vitals signs and nursing note reviewed. Constitutional:       Appearance: Normal appearance. HENT:      Head: Atraumatic.       Nose: Nose normal.      Mouth/Throat:      Mouth: Mucous membranes are moist.   Eyes:      Extraocular Movements: Extraocular movements intact. Neck:      Musculoskeletal: Normal range of motion and neck supple. Cardiovascular:      Rate and Rhythm: Normal rate. Pulmonary:      Effort: Pulmonary effort is normal.      Breath sounds: Normal breath sounds. Musculoskeletal: Normal range of motion. General: No swelling. Skin:     General: Skin is warm. Neurological:      General: No focal deficit present. Psychiatric:         Mood and Affect: Mood normal.         :   Moderate Obstructive Sleep Apnea/Hypopnea Syndrome under good control on PAP at 8 cmwp. Diagnosis Orders   1. DIANE on CPAP     2. Dependence on other enabling machines and devices       Plan: Will continue the PAP at 8 cmwp. I will order PAP supplies, mask, filters. ... We discussed the proportionality between weight and AHI. With 10% weight change, the AHI has a 27% proportionate change. With 20% weight change, the AHI has a 45-50% proportionate change. No orders of the defined types were placed in this encounter. Return in about 1 year (around 10/27/2021) for Reveiwing CPAP usage and compliance report and tro.     Gretta Guzman MD  Medical Director - [facility]

## 2020-12-24 RX ORDER — FUROSEMIDE 40 MG/1
40 TABLET ORAL DAILY
Qty: 90 TABLET | Refills: 3 | Status: SHIPPED | OUTPATIENT
Start: 2020-12-24 | End: 2021-12-27 | Stop reason: SDUPTHER

## 2021-01-06 NOTE — PROGRESS NOTES
[address]    Addy Roberts  1950 January 7, 2021    CC: \"I have gained weight\"     HPI:  The patient is 78 y.o. male with past medical history of paroxsymal atrial fibrillation and morbid obesity. He underwent successful cardioversion on 7/24/18. He presented in atrial fibrillation in office on 7/22/19 and underwent successful cardioversion on 8/6/19. He continued in a regular rhythm in office with [facility], [address] on 9/11/19. He presents today for follow up. Today, he reports feeling \"okay\". He admits to [employer] and gaining weight as a result. He presents with a cane to aid in ambulation. Patient denies exertional chest pain/pressure, dyspnea at rest, COULTER, PND, orthopnea, palpitations, lightheadedness, weight changes, changes in LE edema, and syncope. He reports medication complication     He was a previous smoker and quit in 2007. Review of Systems:  Constitutional: No fatigue, weakness, night sweats or fever. HEENT: No new vision difficulties or ringing in the ears. Respiratory: No new SOB, PND, orthopnea or cough. Cardiovascular: See HPI   GI: No n/v, diarrhea, constipation, abdominal pain or changes in bowel habits. No melena, no hematochezia  : No urinary frequency, urgency, incontinence, hematuria or dysuria. Skin: No cyanosis or skin lesions. Musculoskeletal: No new muscle or joint pain. Neurological: No syncope or TIA-like symptoms.   Psychiatric: No anxiety, insomnia or depression     Past Medical History:   Diagnosis Date    Chronic atrial fibrillation ([address] 06/21/2018    Dr. Jcarlos Dubon / Cardioversion 7-24-18; DCCV 8/2019    Former smoker     Quit 2007    Morbid obesity due to excess calories ([address]     Obstructive sleep apnea 07/2018    CPAP per Dr. Uribe Doctor Pilonidal cyst     Primary osteoarthritis involving multiple joints      Past Surgical History:   Procedure Laterality Date    AXILLARY SURGERY Right     Cyst Excision    CARDIOVERSION  2018    Dr. Leatha Westfall    COLONOSCOPY  10/16/2015    CYST REMOVAL Bilateral     Groin    KNEE ARTHROSCOPY Left     PILONIDAL CYST EXCISION      Multiple    WISDOM TOOTH EXTRACTION       Family History   Problem Relation Age of Onset    Diabetes Mother     High Blood Pressure Mother     Heart Disease Mother         Arrhythmia    Stroke Mother     Diabetes Brother     Heart Disease Brother         Arrhythmia    Cancer Maternal Grandmother     Diabetes Paternal Grandmother     Diabetes Brother     Heart Disease Brother         MI    High Blood Pressure Brother    Mother has atrial fibrillation as well as one of his brothers. One of his brothers passed from a heart attack but was a diabetic with poor control. Social History     Tobacco Use    Smoking status: Former Smoker     Quit date: 2007     Years since quittin.8    Smokeless tobacco: Never Used   Substance Use Topics    Alcohol use: Yes     Comment: Social; very rare    Drug use: No       No Known Allergies  Current Outpatient Medications   Medication Sig Dispense Refill    furosemide (LASIX) 40 MG tablet Take 1 tablet by mouth daily 90 tablet 3    rivaroxaban (XARELTO) 20 MG TABS tablet TAKE 1 TABLET BY MOUTH DAILY WITH BREAKFAST 90 tablet 3    amiodarone (CORDARONE) 200 MG tablet Take 100 mg on Chv-Geuq-Pjl and 200 mg tablet other days of the week 90 tablet 3     No current facility-administered medications for this visit.       [id number] Score for Atrial Fibrillation Stroke Risk   Risk   Factors  Component Value   C CHF No 0   H HTN No 0   A2 Age >= 76 No,  (79 y.o.) 0   D DM No 0   S2 Prior Stroke/TIA No 0   V Vascular Disease No 0   A Age 74-69 Yes,  (79 y.o.) 1   Sc Sex male 0    [id number]  Score  1   Score last updated 21 62:48 AM EST    Click here for a link to the UpToDate guideline \"Atrial Fibrillation: Anticoagulation therapy to prevent embolization    Disclaimer: Risk Score calculation is dependent on accuracy of patient problem list and past encounter diagnosis. Physical Exam:   /86   Pulse 54   Temp 97.1 °F (36.2 °C)   Ht 5' 8\" (1.727 m)   Wt (!) 362 lb 9.6 oz (164.5 kg)   SpO2 98%   BMI 55.13 kg/m²   No intake or output data in the 24 hours ending 01/07/21 0955  Wt Readings from Last 2 Encounters:   01/07/21 (!) 362 lb 9.6 oz (164.5 kg)   10/27/20 (!) 339 lb (153.8 kg)     Constitutional: He is oriented to person, place, and time. He appears morbidly obese. In no acute distress. Head: Normocephalic and atraumatic. Neck: Neck supple. No JVD present. Carotid bruit is not present. No mass and no thyromegaly present. No lymphadenopathy present. Cardiovascular: Irreg irreg, normal heart sounds and intact distal pulses. Exam reveals no gallop and no friction rub. No murmur heard. Pulmonary/Chest: Effort normal and breath sounds normal. No respiratory distress. He has no wheezes, rhonchi or rales. Abdominal: Soft, non-tender. Bowel sounds and aorta are normal. He exhibits no organomegaly, mass or bruit. Extremities: No edema, cyanosis, or clubbing. Pulses are 2+ radial/carotid/dorsalis pedis and posterior tibial bilaterally. Neurological: He is alert and oriented to person, place, and time. He has normal reflexes. No cranial nerve deficit. Coordination normal.   Skin: Skin is warm and dry. There is no rash or diaphoresis. Psychiatric: He has a normal mood and affect.  His speech is normal and behavior is normal.     EKG Interpretation 6/27/18: Atrial fibrillation with RVR, poor R wave progression  EKG Interpretation 1/16/19: Sinus bradycardia with first degree AV block  EKG Interpretation 7/22/19: Atrial fibrillation  EKG Interpretation 7/2/20: Sinus rhythm  EKG Interpretation 1/7/21: Sinus bradycardia       Procedures:     DCCV 7/24/18  Successful synchronized cardioversion from atrial fibrillation  to a normal sinus rhythm with a single 200 J biphasic synchronized  cardioversion shock. DCCV 8/6/19  Successful synchronized cardioversion from atrial  fibrillation to sinus bradycardia with first-degree AV block using a  single biphasic cardioversion shock. Imaging:     Echo 7/23/18  Normal LV size; Estimated ejection fraction is 45-50%. Cannot comment about  wall motion abn. Left atrium is of normal size. Right ventricular systolic function is normal.       Lab Review:   Lab Results   Component Value Date    TRIG 89 06/21/2018    HDL 57 06/21/2018    LDLCALC 109 06/21/2018    LABVLDL 18 06/21/2018     Lab Results   Component Value Date     07/02/2020    K 4.2 07/02/2020    BUN 29 07/02/2020    CREATININE 1.1 07/02/2020       Assessment:  1. Atrial fibrillation, paroxysmal   2. Morbid obesity   3. DIANE, on CPAP therapy    Plan:  I think that Mr. Rocío Lubin  is entirely stable from a cardiovascular standpoint. He continues in a regular rhythm by EKG today in the office. His CHADS Vasc score is low at only 1 and I will therefore have him discontinue Xarelto at this point. He can reduce his dose of amiodarone to 100 mg once daily. I have encouraged him in weight loss with increased aerobic activity and improved dietary choices. I will see him in the office for follow up in 6 months. This note was scribed in the presence of Zev Fischer MD by [employer], RN. Physician Attestation:  The scribes documentation has been prepared under my direction and personally reviewed by me in its entirety. I, Dr. Cherise Stacy personally performed the services described in this documentation as scribed by my RN in my presence, and I confirm that the note above accurately reflects all work, treatment, procedures, and medical decision making performed by me.

## 2021-01-07 ENCOUNTER — OFFICE VISIT (OUTPATIENT)
Dept: CARDIOLOGY CLINIC | Age: 71
End: 2021-01-07
Payer: MEDICARE

## 2021-01-07 VITALS
TEMPERATURE: 97.1 F | HEART RATE: 54 BPM | HEIGHT: 68 IN | DIASTOLIC BLOOD PRESSURE: 86 MMHG | BODY MASS INDEX: 47.74 KG/M2 | OXYGEN SATURATION: 98 % | WEIGHT: 315 LBS | SYSTOLIC BLOOD PRESSURE: 138 MMHG

## 2021-01-07 DIAGNOSIS — G47.33 OSA (OBSTRUCTIVE SLEEP APNEA): ICD-10-CM

## 2021-01-07 DIAGNOSIS — I48.0 PAROXYSMAL ATRIAL FIBRILLATION (HCC): Primary | ICD-10-CM

## 2021-01-07 DIAGNOSIS — E66.01 MORBID OBESITY (HCC): ICD-10-CM

## 2021-01-07 PROCEDURE — G8417 CALC BMI ABV UP PARAM F/U: HCPCS | Performed by: INTERNAL MEDICINE

## 2021-01-07 PROCEDURE — 4040F PNEUMOC VAC/ADMIN/RCVD: CPT | Performed by: INTERNAL MEDICINE

## 2021-01-07 PROCEDURE — 1123F ACP DISCUSS/DSCN MKR DOCD: CPT | Performed by: INTERNAL MEDICINE

## 2021-01-07 PROCEDURE — 99214 OFFICE O/P EST MOD 30 MIN: CPT | Performed by: INTERNAL MEDICINE

## 2021-01-07 PROCEDURE — 93000 ELECTROCARDIOGRAM COMPLETE: CPT | Performed by: INTERNAL MEDICINE

## 2021-01-07 PROCEDURE — G8427 DOCREV CUR MEDS BY ELIG CLIN: HCPCS | Performed by: INTERNAL MEDICINE

## 2021-01-07 PROCEDURE — 1036F TOBACCO NON-USER: CPT | Performed by: INTERNAL MEDICINE

## 2021-01-07 PROCEDURE — 3017F COLORECTAL CA SCREEN DOC REV: CPT | Performed by: INTERNAL MEDICINE

## 2021-01-07 PROCEDURE — G8484 FLU IMMUNIZE NO ADMIN: HCPCS | Performed by: INTERNAL MEDICINE

## 2021-01-07 RX ORDER — AMIODARONE HYDROCHLORIDE 200 MG/1
100 TABLET ORAL DAILY
Qty: 90 TABLET | Refills: 3
Start: 2021-01-07 | End: 2022-03-22

## 2021-02-01 ENCOUNTER — VIRTUAL VISIT (OUTPATIENT)
Dept: FAMILY MEDICINE CLINIC | Age: 71
End: 2021-02-01
Payer: MEDICARE

## 2021-02-01 ENCOUNTER — NURSE TRIAGE (OUTPATIENT)
Dept: OTHER | Facility: CLINIC | Age: 71
End: 2021-02-01

## 2021-02-01 DIAGNOSIS — J06.9 VIRAL URI: Primary | ICD-10-CM

## 2021-02-01 PROCEDURE — 3017F COLORECTAL CA SCREEN DOC REV: CPT | Performed by: FAMILY MEDICINE

## 2021-02-01 PROCEDURE — 99213 OFFICE O/P EST LOW 20 MIN: CPT | Performed by: FAMILY MEDICINE

## 2021-02-01 PROCEDURE — G8427 DOCREV CUR MEDS BY ELIG CLIN: HCPCS | Performed by: FAMILY MEDICINE

## 2021-02-01 PROCEDURE — 1123F ACP DISCUSS/DSCN MKR DOCD: CPT | Performed by: FAMILY MEDICINE

## 2021-02-01 PROCEDURE — 4040F PNEUMOC VAC/ADMIN/RCVD: CPT | Performed by: FAMILY MEDICINE

## 2021-02-01 ASSESSMENT — ENCOUNTER SYMPTOMS
SINUS PAIN: 0
COUGH: 1
SORE THROAT: 1
SHORTNESS OF BREATH: 0
SINUS PRESSURE: 0
RHINORRHEA: 0
WHEEZING: 0

## 2021-02-01 NOTE — PROGRESS NOTES
Upper Respiratory Infection:  Patient started 3 days ago with a mild cough, sore throat. He has not taken any medication for it. He is not a smoker. Review of Systems   Constitutional: Negative for chills and fever. HENT: Positive for sore throat. Negative for congestion, postnasal drip, rhinorrhea, sinus pressure, sinus pain and sneezing. Respiratory: Positive for cough. Negative for shortness of breath and wheezing. There were no vitals taken for this visit. Objective:   Physical Exam  Constitutional:       General: He is not in acute distress. Appearance: Normal appearance. He is obese. He is not ill-appearing or toxic-appearing. HENT:      Head: Normocephalic. Pulmonary:      Effort: Pulmonary effort is normal.   Neurological:      Mental Status: He is alert. Assessment:      Viral URI       Plan:      I recommended Mucinex DM over the counter  Patient was given the phone number to get COVID tested. Patient was instructed to self quarantine until the test result is negative and the symptoms have completely resolved. Recommended that patient have an AWV with our nurse.     RTO as needed           Keerthi Evans DO

## 2021-02-01 NOTE — TELEPHONE ENCOUNTER
Please have him see me virtually or schedule at a [facility]. He should get tested for COVID.

## 2021-02-01 NOTE — TELEPHONE ENCOUNTER
[facility] [place] [address] [facility] Brief description of triage: Pt's daughter is positive for COVID-19 and lives in separate area of pt's house. Per pt report, there is no physical contact with interactions with his daughter. Pt currently has cough and sore throat. Triage indicates for patient to call PCP now and pt agreeable with plan. Care advice provided. Recommended using local [facility] of health website for testing locations and up to date information. Caller verbalizes understanding, denies any other questions or concerns; instructed to call back for any new or worsening symptoms. Reason for Disposition   [1] HIGH RISK patient (e.g., age > 59 years, diabetes, heart or lung disease, weak immune system) AND [2] new or worsening symptoms    Answer Assessment - Initial Assessment Questions  1. COVID-19 DIAGNOSIS: \"Who made your Coronavirus (COVID-19) diagnosis? \" \"Was it confirmed by a positive lab test?\" If not diagnosed by a HCP, ask \"Are there lots of cases (community spread) where you live? \" (See public health department website, if unsure)      Not confirmed    2. COVID-19 EXPOSURE: \"Was there any known exposure to COVID before the symptoms began? \" CDC Definition of close contact: within 6 feet (2 meters) for a total of 15 minutes or more over a 24-hour period. Pt's daughter, who lives in upper part of the house, tested positive. Symptoms started a few days ago. 3. ONSET: \"When did the COVID-19 symptoms start? \"       Cough started Friday    4. WORST SYMPTOM: \"What is your worst symptom? \" (e.g., cough, fever, shortness of breath, muscle aches)      Cough, sore throat    5. COUGH: \"Do you have a cough? \" If so, ask: \"How bad is the cough? \"        Intermittent (1-2x/hr), dry cough, does not interfere with sleep    6. FEVER: \"Do you have a fever? \" If so, ask: \"What is your temperature, how was it measured, and when did it start? \"      Denies    7.  RESPIRATORY STATUS: \"Describe your breathing? \" (e.g., shortness of breath, wheezing, unable to speak)       Breathing normally    8. BETTER-SAME-WORSE: Jayesh Wild you getting better, staying the same or getting worse compared to yesterday? \"  If getting worse, ask, \"In what way? \"      Same    9. HIGH RISK DISEASE: \"Do you have any chronic medical problems? \" (e.g., asthma, heart or lung disease, weak immune system, obesity, etc.)      Sleep apnea, Afib (heart is now in normal rhythm)    10. PREGNANCY: \"Is there any chance you are pregnant? \" \"When was your last menstrual period? \"        N/A    11. OTHER SYMPTOMS: \"Do you have any other symptoms? \"  (e.g., chills, fatigue, headache, loss of smell or taste, muscle pain, sore throat; new loss of smell or taste especially support the diagnosis of COVID-19)        Denies other than sore throat and cough    Protocols used: CORONAVIRUS (COVID-19) DIAGNOSED OR SUSPECTEDADULTUpper [facility] Medical Center

## 2021-02-02 ENCOUNTER — OFFICE VISIT (OUTPATIENT)
Dept: PRIMARY CARE CLINIC | Age: 71
End: 2021-02-02
Payer: MEDICARE

## 2021-02-02 DIAGNOSIS — Z20.822 SUSPECTED COVID-19 VIRUS INFECTION: Primary | ICD-10-CM

## 2021-02-02 PROCEDURE — 99211 OFF/OP EST MAY X REQ PHY/QHP: CPT | Performed by: NURSE PRACTITIONER

## 2021-02-02 PROCEDURE — G8428 CUR MEDS NOT DOCUMENT: HCPCS | Performed by: NURSE PRACTITIONER

## 2021-02-02 PROCEDURE — G8417 CALC BMI ABV UP PARAM F/U: HCPCS | Performed by: NURSE PRACTITIONER

## 2021-02-02 NOTE — PROGRESS NOTES
Jerzy Silverio Nix received a viral test for COVID-19. They were educated on isolation and quarantine as appropriate. For any symptoms, they were directed to seek care from their PCP, given contact information to establish with a doctor, directed to an urgent care or the emergency room.

## 2021-02-03 LAB — SARS-COV-2, NAA: DETECTED

## 2021-07-29 ENCOUNTER — OFFICE VISIT (OUTPATIENT)
Dept: CARDIOLOGY CLINIC | Age: 71
End: 2021-07-29
Payer: MEDICARE

## 2021-07-29 VITALS
DIASTOLIC BLOOD PRESSURE: 80 MMHG | HEIGHT: 68 IN | BODY MASS INDEX: 47.74 KG/M2 | HEART RATE: 52 BPM | WEIGHT: 315 LBS | OXYGEN SATURATION: 97 % | SYSTOLIC BLOOD PRESSURE: 128 MMHG

## 2021-07-29 DIAGNOSIS — I48.0 PAROXYSMAL ATRIAL FIBRILLATION (HCC): Primary | ICD-10-CM

## 2021-07-29 DIAGNOSIS — E66.01 MORBID OBESITY (HCC): ICD-10-CM

## 2021-07-29 DIAGNOSIS — G47.33 OSA (OBSTRUCTIVE SLEEP APNEA): ICD-10-CM

## 2021-07-29 PROCEDURE — 3017F COLORECTAL CA SCREEN DOC REV: CPT | Performed by: INTERNAL MEDICINE

## 2021-07-29 PROCEDURE — 1036F TOBACCO NON-USER: CPT | Performed by: INTERNAL MEDICINE

## 2021-07-29 PROCEDURE — 93000 ELECTROCARDIOGRAM COMPLETE: CPT | Performed by: INTERNAL MEDICINE

## 2021-07-29 PROCEDURE — 99214 OFFICE O/P EST MOD 30 MIN: CPT | Performed by: INTERNAL MEDICINE

## 2021-07-29 PROCEDURE — G8417 CALC BMI ABV UP PARAM F/U: HCPCS | Performed by: INTERNAL MEDICINE

## 2021-07-29 PROCEDURE — 1123F ACP DISCUSS/DSCN MKR DOCD: CPT | Performed by: INTERNAL MEDICINE

## 2021-07-29 PROCEDURE — 4040F PNEUMOC VAC/ADMIN/RCVD: CPT | Performed by: INTERNAL MEDICINE

## 2021-07-29 PROCEDURE — G8427 DOCREV CUR MEDS BY ELIG CLIN: HCPCS | Performed by: INTERNAL MEDICINE

## 2021-07-29 NOTE — PROGRESS NOTES
[facility]    Gayatri Welsh  1950 July 29, 2021    CC: \"my knees are shot\"      HPI:  The patient is 73 y.o. male with past medical history of paroxsymal atrial fibrillation and morbid obesity. He underwent successful cardioversion on 7/24/18. He presented in atrial fibrillation in office on 7/22/19 and underwent successful cardioversion on 8/6/19. He continued in a regular rhythm in office with Loli Hansen, [address]  on 9/11/19. He presents today for follow up. He presents today for follow up and reports feeling well overall. He admits to chronic knee pain but is unable to undergo a knee replacement due to chronic cyst. His breathing has been comfortable without shortness of breath. He attributes his weight gain to poor dietary choices and a stressful living situation. He does not participate in any regular exercise and attributes this to his chronic knee pain. He was a previous smoker and quit in 2007. Review of Systems:  Constitutional: No fatigue, weakness, night sweats or fever. HEENT: No new vision difficulties or ringing in the ears. Respiratory: No new SOB, PND, orthopnea or cough. Cardiovascular: See HPI   GI: No n/v, diarrhea, constipation, abdominal pain or changes in bowel habits. No melena, no hematochezia  : No urinary frequency, urgency, incontinence, hematuria or dysuria. Skin: No cyanosis or skin lesions. Musculoskeletal: No new muscle pain. +chronic bilateral knee pain   Neurological: No syncope or TIA-like symptoms.   Psychiatric: No anxiety, insomnia or depression     Past Medical History:   Diagnosis Date    Chronic atrial fibrillation ([address] 06/21/2018    Dr. Kira Mayen / Cardioversion 7-24-18; DCCV 8/2019    Former smoker     Quit 2007    Morbid obesity due to excess calories ([address]     Obstructive sleep apnea 07/2018    CPAP per Dr. Chelle Shoemaker Pilonidal cyst     Primary osteoarthritis involving multiple joints      Past Surgical History: Procedure Laterality Date    AXILLARY SURGERY Right     Cyst Excision    CARDIOVERSION  2018    Dr. Luis Mathis    COLONOSCOPY  10/16/2015    CYST REMOVAL Bilateral     Groin    KNEE ARTHROSCOPY Left     PILONIDAL CYST EXCISION      Multiple    WISDOM TOOTH EXTRACTION       Family History   Problem Relation Age of Onset    Diabetes Mother     High Blood Pressure Mother     Heart Disease Mother         Arrhythmia    Stroke Mother     Diabetes Brother     Heart Disease Brother         Arrhythmia    Cancer Maternal Grandmother     Diabetes Paternal Grandmother     Diabetes Brother     Heart Disease Brother         MI    High Blood Pressure Brother    Mother has atrial fibrillation as well as one of his brothers. One of his brothers passed from a heart attack but was a diabetic with poor control. Social History     Tobacco Use    Smoking status: Former Smoker     Quit date: 2007     Years since quittin.4    Smokeless tobacco: Never Used   Vaping Use    Vaping Use: Never used   Substance Use Topics    Alcohol use: Yes     Comment: Social; very rare    Drug use: No       No Known Allergies  Current Outpatient Medications   Medication Sig Dispense Refill    amiodarone (CORDARONE) 200 MG tablet Take 0.5 tablets by mouth daily 90 tablet 3    furosemide (LASIX) 40 MG tablet Take 1 tablet by mouth daily 90 tablet 3     No current facility-administered medications for this visit.      [id number] Score for Atrial Fibrillation Stroke Risk   Risk   Factors  Component Value   C CHF No 0   H HTN No 0   A2 Age >= 76 No,  (77 y.o.) 0   D DM No 0   S2 Prior Stroke/TIA No 0   V Vascular Disease No 0   A Age 74-69 Yes,  (77 y.o.) 1   Sc Sex male 0    [id number]  Score  1   Score last updated 21 80:60 AM EST    Click here for a link to the UpToDate guideline \"Atrial Fibrillation: Anticoagulation therapy to prevent embolization    Disclaimer: Risk Score calculation is dependent on accuracy of patient problem list and past encounter diagnosis. Physical Exam:   /80   Pulse 52   Ht 5' 8\" (1.727 m)   Wt (!) 373 lb (169.2 kg)   SpO2 97%   BMI 56.71 kg/m²   No intake or output data in the 24 hours ending 07/29/21 1334  Wt Readings from Last 2 Encounters:   07/29/21 (!) 373 lb (169.2 kg)   01/07/21 (!) 362 lb 9.6 oz (164.5 kg)     Constitutional: He is oriented to person, place, and time. He appears morbidly obese. In no acute distress. Head: Normocephalic and atraumatic. Neck: Neck supple. No JVD present. Carotid bruit is not present. No mass and no thyromegaly present. No lymphadenopathy present. Cardiovascular: Irreg irreg, normal heart sounds and intact distal pulses. Exam reveals no gallop and no friction rub. No murmur heard. Pulmonary/Chest: Effort normal and breath sounds normal. No respiratory distress. He has no wheezes, rhonchi or rales. Abdominal: Soft, non-tender. Bowel sounds and aorta are normal. He exhibits no organomegaly, mass or bruit. Extremities: No edema, cyanosis, or clubbing. Pulses are 2+ radial/carotid/dorsalis pedis and posterior tibial bilaterally. Neurological: He is alert and oriented to person, place, and time. He has normal reflexes. No cranial nerve deficit. Coordination normal.   Skin: Skin is warm and dry. There is no rash or diaphoresis. Psychiatric: He has a normal mood and affect.  His speech is normal and behavior is normal.     EKG Interpretation 6/27/18: Atrial fibrillation with RVR, poor R wave progression  EKG Interpretation 1/16/19: Sinus bradycardia with first degree AV block  EKG Interpretation 7/22/19: Atrial fibrillation  EKG Interpretation 7/2/20: Sinus rhythm  EKG Interpretation 1/7/21: Sinus bradycardia   EKG Interpretation 7/29/21: Sinus bradycardia with normal axis      Procedures:     DCCV 7/24/18  Successful synchronized cardioversion from atrial fibrillation  to a normal sinus rhythm with a single 200 J biphasic synchronized  cardioversion shock. DCCV 8/6/19  Successful synchronized cardioversion from atrial  fibrillation to sinus bradycardia with first-degree AV block using a  single biphasic cardioversion shock. Imaging:     Echo 7/23/18  Normal LV size; Estimated ejection fraction is 45-50%. Cannot comment about  wall motion abn. Left atrium is of normal size. Right ventricular systolic function is normal.       Lab Review:   Lab Results   Component Value Date    TRIG 89 06/21/2018    HDL 57 06/21/2018    LDLCALC 109 06/21/2018    LABVLDL 18 06/21/2018     Lab Results   Component Value Date     07/02/2020    K 4.2 07/02/2020    BUN 29 07/02/2020    CREATININE 1.1 07/02/2020       Assessment:  1. Atrial fibrillation, paroxysmal   2. Morbid obesity   3. DIANE, on CPAP therapy    Plan:  I think that Mr. Arely Gomes  is entirely stable from a cardiovascular standpoint. I see no need to make any changes currently in his medical regimen or pursue further testing. He continues in a regular rhythm by EKG today in the office. He is not currently anticoagulated due to his low CHADS Vasc. His weight is up significantly since he was last seen in the office. I have placed a referral to Weight Management for him. I will see him in the office for follow up in 6 months. This note was scribed in the presence of Gabriele Anthony MD by [employer], RN. Physician Attestation:  The scribes documentation has been prepared under my direction and personally reviewed by me in its entirety. I, Dr. Mary Jo March personally performed the services described in this documentation as scribed by my RN in my presence, and I confirm that the note above accurately reflects all work, treatment, procedures, and medical decision making performed by me.

## 2021-08-17 ENCOUNTER — TELEPHONE (OUTPATIENT)
Dept: PULMONOLOGY | Age: 71
End: 2021-08-17

## 2022-03-02 NOTE — PROGRESS NOTES
[facility]    Milagro Mclain  1950    March 3, 2022    CC: \"I've lost weight\"     HPI:  The patient is 73 y.o. male with past medical history of paroxsymal atrial fibrillation and morbid obesity. He underwent successful cardioversion on 7/24/18. He presented in atrial fibrillation in office on 7/22/19 and underwent successful cardioversion on 8/6/19. He presents today for follow up. He has lost 80 pounds since last July with improved dietary choices. He reports feeling well overall. He continues with chronic knee pain but states this has improved some since his weight loss. He is no longer using CPAP therapy. He reports medication compliance and is tolerating. Patient denies exertional chest pain/pressure, dyspnea at rest, COULTER, PND, orthopnea, palpitations, lightheadedness, weight changes, changes in LE edema, and syncope. He was a previous smoker and quit in 2007. Review of Systems:  Constitutional: No fatigue, weakness, night sweats or fever. HEENT: No new vision difficulties or ringing in the ears. Respiratory: No new SOB, PND, orthopnea or cough. Cardiovascular: See HPI   GI: No n/v, diarrhea, constipation, abdominal pain or changes in bowel habits. No melena, no hematochezia  : No urinary frequency, urgency, incontinence, hematuria or dysuria. Skin: No cyanosis or skin lesions. Musculoskeletal: No new muscle pain. +chronic bilateral knee pain   Neurological: No syncope or TIA-like symptoms.   Psychiatric: No anxiety, insomnia or depression     Past Medical History:   Diagnosis Date    Chronic atrial fibrillation ([address] 06/21/2018    Dr. Rody Forrest / Cardioversion 7-24-18; DCCV 8/2019    Former smoker     Quit 2007    Morbid obesity due to excess calories ([address]     Obstructive sleep apnea 07/2018    CPAP per Dr. Thania Granados Pilonidal cyst     Primary osteoarthritis involving multiple joints      Past Surgical History:   Procedure Laterality Date    AXILLARY SURGERY Right     Cyst Excision    CARDIOVERSION  07/24/2018    Dr. Bell Cancel    COLONOSCOPY  10/16/2015    CYST REMOVAL Bilateral     Groin    KNEE ARTHROSCOPY Left     PILONIDAL CYST EXCISION      Multiple    WISDOM TOOTH EXTRACTION       Family History   Problem Relation Age of Onset    Diabetes Mother     High Blood Pressure Mother     Heart Disease Mother         Arrhythmia    Stroke Mother     Diabetes Brother     Heart Disease Brother         Arrhythmia    Cancer Maternal Grandmother     Diabetes Paternal Grandmother     Diabetes Brother     Heart Disease Brother         MI    High Blood Pressure Brother    Mother has atrial fibrillation as well as one of his brothers. One of his brothers passed from a heart attack but was a diabetic with poor control. Social History     Tobacco Use    Smoking status: Former Smoker     Quit date: 2/26/2007     Years since quitting: 15.0    Smokeless tobacco: Never Used   Vaping Use    Vaping Use: Never used   Substance Use Topics    Alcohol use: Yes     Comment: Social; very rare    Drug use: No       No Known Allergies  Current Outpatient Medications   Medication Sig Dispense Refill    furosemide (LASIX) 40 MG tablet Take 1 tablet by mouth daily 90 tablet 0    amiodarone (CORDARONE) 200 MG tablet Take 0.5 tablets by mouth daily 90 tablet 3     No current facility-administered medications for this visit.      [id number] Score for Atrial Fibrillation Stroke Risk   Risk   Factors  Component Value   C CHF No 0   H HTN No 0   A2 Age >= 76 No,  (77 y.o.) 0   D DM No 0   S2 Prior Stroke/TIA No 0   V Vascular Disease No 0   A Age 74-69 Yes,  (77 y.o.) 1   Sc Sex male 0    [id number]  Score  1   Score last updated 3/2/22 86:01 AM EST    Click here for a link to the UpToDate guideline \"Atrial Fibrillation: Anticoagulation therapy to prevent embolization    Disclaimer: Risk Score calculation is dependent on accuracy of patient problem list and past encounter diagnosis. Physical Exam:   /84   Pulse 57   Ht 5' 8\" (1.727 m)   Wt 294 lb (133.4 kg)   SpO2 99%   BMI 44.70 kg/m²   No intake or output data in the 24 hours ending 03/03/22 1038  Wt Readings from Last 2 Encounters:   03/03/22 294 lb (133.4 kg)   07/29/21 (!) 373 lb (169.2 kg)     Constitutional: He is oriented to person, place, and time. He appears morbidly obese. In no acute distress. Head: Normocephalic and atraumatic. Neck: Neck supple. No JVD present. Carotid bruit is not present. No mass and no thyromegaly present. No lymphadenopathy present. Cardiovascular: Regular but kenyatta, normal heart sounds and intact distal pulses. Exam reveals no gallop and no friction rub. No murmur heard. Pulmonary/Chest: Effort normal and breath sounds normal. No respiratory distress. He has no wheezes, rhonchi or rales. Abdominal: Soft, non-tender. Bowel sounds and aorta are normal. He exhibits no organomegaly, mass or bruit. Extremities: No edema, cyanosis, or clubbing. Pulses are 2+ radial/carotid/dorsalis pedis and posterior tibial bilaterally. Neurological: He is alert and oriented to person, place, and time. He has normal reflexes. No cranial nerve deficit. Coordination normal.   Skin: Skin is warm and dry. There is no rash or diaphoresis. Psychiatric: He has a normal mood and affect.  His speech is normal and behavior is normal.     Personally reviewed and interpreted   EKG Interpretation 6/27/18: Atrial fibrillation with RVR, poor R wave progression  EKG Interpretation 1/16/19: Sinus bradycardia with first degree AV block  EKG Interpretation 7/22/19: Atrial fibrillation  EKG Interpretation 7/2/20: Sinus rhythm  EKG Interpretation 1/7/21: Sinus bradycardia   EKG Interpretation 7/29/21: Sinus bradycardia with normal axis  EKG Interpretation 3/3/22: Sinus bradycardia with normal axis      Procedures:     DCCV 7/24/18  Successful synchronized cardioversion from atrial fibrillation  to a normal sinus rhythm with a single 200 J biphasic synchronized  cardioversion shock. DCCV 8/6/19  Successful synchronized cardioversion from atrial  fibrillation to sinus bradycardia with first-degree AV block using a  single biphasic cardioversion shock. Imaging:     Echo 7/23/18  Normal LV size; Estimated ejection fraction is 45-50%. Cannot comment about  wall motion abn. Left atrium is of normal size. Right ventricular systolic function is normal.       Lab Review:   Lab Results   Component Value Date    TRIG 89 06/21/2018    HDL 57 06/21/2018    LDLCALC 109 06/21/2018    LABVLDL 18 06/21/2018     Lab Results   Component Value Date     07/02/2020    K 4.2 07/02/2020    BUN 29 07/02/2020    CREATININE 1.1 07/02/2020       Assessment:  1. Atrial fibrillation, paroxysmal   2. Morbid obesity   3. DIANE, on CPAP therapy    Plan:  He continues in a regular rhythm today by EKG and will remain on amiodarone therapy. He is not currently anticoagulated due to his low CHADS Vasc. I will have him complete a CMP, TSH and amiodarone level today. I have congratulated him on his weight loss and encouraged him to continue his improved dietary choices and to increase his aerobic activity as tolerated. I have personally reviewed all previous testing for this visit today including imaging, lab results and EKG as detailed above. I will see him in the office for follow up in 1 year. This note was scribed in the presence of Supriya Nielsen MD by [employer], RN. Physician Attestation:  The scribes documentation has been prepared under my direction and personally reviewed by me in its entirety. I, Dr. Bety Villela personally performed the services described in this documentation as scribed by my RN in my presence, and I confirm that the note above accurately reflects all work, treatment, procedures, and medical decision making performed by me.

## 2022-03-03 ENCOUNTER — OFFICE VISIT (OUTPATIENT)
Dept: CARDIOLOGY CLINIC | Age: 72
End: 2022-03-03
Payer: MEDICARE

## 2022-03-03 VITALS
HEIGHT: 68 IN | OXYGEN SATURATION: 99 % | BODY MASS INDEX: 44.56 KG/M2 | HEART RATE: 57 BPM | SYSTOLIC BLOOD PRESSURE: 132 MMHG | DIASTOLIC BLOOD PRESSURE: 84 MMHG | WEIGHT: 294 LBS

## 2022-03-03 DIAGNOSIS — E66.01 MORBID OBESITY (HCC): ICD-10-CM

## 2022-03-03 DIAGNOSIS — I48.0 PAROXYSMAL ATRIAL FIBRILLATION (HCC): ICD-10-CM

## 2022-03-03 DIAGNOSIS — G47.33 OSA (OBSTRUCTIVE SLEEP APNEA): ICD-10-CM

## 2022-03-03 DIAGNOSIS — I48.0 PAROXYSMAL ATRIAL FIBRILLATION (HCC): Primary | ICD-10-CM

## 2022-03-03 LAB
A/G RATIO: 1.6 (ref 1.1–2.2)
ALBUMIN SERPL-MCNC: 4.3 G/DL (ref 3.4–5)
ALP BLD-CCNC: 75 U/L (ref 40–129)
ALT SERPL-CCNC: 17 U/L (ref 10–40)
ANION GAP SERPL CALCULATED.3IONS-SCNC: 17 MMOL/L (ref 3–16)
AST SERPL-CCNC: 19 U/L (ref 15–37)
BILIRUB SERPL-MCNC: 0.3 MG/DL (ref 0–1)
BUN BLDV-MCNC: 28 MG/DL (ref 7–20)
CALCIUM SERPL-MCNC: 9.2 MG/DL (ref 8.3–10.6)
CHLORIDE BLD-SCNC: 101 MMOL/L (ref 99–110)
CO2: 22 MMOL/L (ref 21–32)
CREAT SERPL-MCNC: 1.3 MG/DL (ref 0.8–1.3)
GFR AFRICAN AMERICAN: >60
GFR NON-AFRICAN AMERICAN: 55
GLUCOSE BLD-MCNC: 81 MG/DL (ref 70–99)
POTASSIUM SERPL-SCNC: 4.2 MMOL/L (ref 3.5–5.1)
SODIUM BLD-SCNC: 140 MMOL/L (ref 136–145)
TOTAL PROTEIN: 7 G/DL (ref 6.4–8.2)
TSH REFLEX: 1.24 UIU/ML (ref 0.27–4.2)

## 2022-03-03 PROCEDURE — 4040F PNEUMOC VAC/ADMIN/RCVD: CPT | Performed by: INTERNAL MEDICINE

## 2022-03-03 PROCEDURE — 1123F ACP DISCUSS/DSCN MKR DOCD: CPT | Performed by: INTERNAL MEDICINE

## 2022-03-03 PROCEDURE — 99214 OFFICE O/P EST MOD 30 MIN: CPT | Performed by: INTERNAL MEDICINE

## 2022-03-03 PROCEDURE — G8417 CALC BMI ABV UP PARAM F/U: HCPCS | Performed by: INTERNAL MEDICINE

## 2022-03-03 PROCEDURE — 1036F TOBACCO NON-USER: CPT | Performed by: INTERNAL MEDICINE

## 2022-03-03 PROCEDURE — G8484 FLU IMMUNIZE NO ADMIN: HCPCS | Performed by: INTERNAL MEDICINE

## 2022-03-03 PROCEDURE — 93000 ELECTROCARDIOGRAM COMPLETE: CPT | Performed by: INTERNAL MEDICINE

## 2022-03-03 PROCEDURE — G8427 DOCREV CUR MEDS BY ELIG CLIN: HCPCS | Performed by: INTERNAL MEDICINE

## 2022-03-03 PROCEDURE — 3017F COLORECTAL CA SCREEN DOC REV: CPT | Performed by: INTERNAL MEDICINE

## 2022-03-03 NOTE — PATIENT INSTRUCTIONS

## 2022-03-07 LAB
AMIODARONE LEVEL: <0.3 UG/ML (ref 0.5–2)
DES-AMIOD: <0.3 UG/ML

## 2022-03-22 DIAGNOSIS — I48.0 PAROXYSMAL ATRIAL FIBRILLATION (HCC): ICD-10-CM

## 2022-03-22 RX ORDER — FUROSEMIDE 40 MG/1
40 TABLET ORAL DAILY
Qty: 90 TABLET | Refills: 3 | Status: SHIPPED | OUTPATIENT
Start: 2022-03-22

## 2022-03-22 RX ORDER — AMIODARONE HYDROCHLORIDE 200 MG/1
TABLET ORAL
Qty: 90 TABLET | Refills: 3 | Status: SHIPPED | OUTPATIENT
Start: 2022-03-22

## 2022-03-22 NOTE — TELEPHONE ENCOUNTER
Last ov 3/3/22  Pending appt yearly  Last refill lasix 12/27/21 #90 NR, amiodarone 1/7/21 #90x3  Last labs 3/3/22

## 2023-01-31 NOTE — PROGRESS NOTES
[facility]    Roseann Albright  1950 February 2, 2023    CC: \"I'm feeling okay, but gained weight. \"     HPI:  The patient is 81 y.o. male with past medical history of paroxsymal atrial fibrillation and morbid obesity. He underwent successful cardioversion on 7/24/18. He presented in atrial fibrillation in office on 7/22/19 and underwent successful cardioversion on 8/6/19. Today, he states overall he is doing well. He denies any new cardiac complaints. He denies any chest pains or worsening shortness of breath. He reports compliance with his medications and tolerating. He denies any abnormal bleeding or bruising. Patient denies exertional chest pain/pressure, dyspnea at rest, COULTER, PND, orthopnea, palpitations, lightheadedness, weight changes, changes in LE edema, and syncope. He reports weight gain due to inactivity. He was a previous smoker and quit in 2007. Review of Systems:  Constitutional: No fatigue, weakness, night sweats or fever. HEENT: No new vision difficulties or ringing in the ears. Respiratory: No new SOB, PND, orthopnea or cough. Cardiovascular: See HPI   GI: No n/v, diarrhea, constipation, abdominal pain or changes in bowel habits. No melena, no hematochezia  : No urinary frequency, urgency, incontinence, hematuria or dysuria. Skin: No cyanosis or skin lesions. Musculoskeletal: No new muscle pain. +chronic bilateral knee pain   Neurological: No syncope or TIA-like symptoms.   Psychiatric: No anxiety, insomnia or depression     Past Medical History:   Diagnosis Date    Chronic atrial fibrillation ([address] 06/21/2018    Dr. Ruperto Yoder / Cardioversion 7-24-18; DCC 8/2019    Former smoker     Quit 2007    Morbid obesity due to excess calories ([address]     Obstructive sleep apnea 07/2018    CPAP per Dr. Tracy Angulo    Pilonidal cyst     Primary osteoarthritis involving multiple joints      Past Surgical History:   Procedure Laterality Date    AXILLARY SURGERY Right     Cyst Excision    CAPSULOTOMY, HAND  07/24/2018    Dr. Heller [facility]    COLONOSCOPY  10/16/2015    CYST REMOVAL Bilateral     Groin    KNEE ARTHROSCOPY Left     PILONIDAL CYST EXCISION      Multiple    WISDOM TOOTH EXTRACTION       Family History   Problem Relation Age of Onset    Diabetes Mother     High Blood Pressure Mother     Heart Disease Mother         Arrhythmia    Stroke Mother     Diabetes Brother     Heart Disease Brother         Arrhythmia    Cancer Maternal Grandmother     Diabetes Paternal Grandmother     Diabetes Brother     Heart Disease Brother         MI    High Blood Pressure Brother    Mother has atrial fibrillation as well as one of his brothers. One of his brothers passed from a heart attack but was a diabetic with poor control. Social History     Tobacco Use    Smoking status: Former     Types: Cigarettes     Quit date: 2/26/2007     Years since quitting: 15.9    Smokeless tobacco: Never   Vaping Use    Vaping Use: Never used   Substance Use Topics    Alcohol use: Yes     Comment: Social; very rare    Drug use: No     No Known Allergies  Current Outpatient Medications   Medication Sig Dispense Refill    furosemide (LASIX) 40 MG tablet TAKE 1 TABLET BY MOUTH DAILY 90 tablet 3    amiodarone (CORDARONE) 200 MG tablet TAKE ONE-HALF TABLET BY MOUTH [id number] AND 1 TABLET BY MOUTH OTHER DAYS OF THE WEEK 90 tablet 3     No current facility-administered medications for this visit.      [id number] Score for Atrial Fibrillation Stroke Risk   Risk   Factors  Component Value   C CHF No 0   H HTN No 0   A2 Age >= 75 No,  (71 y.o.) 0   D DM No 0   S2 Prior Stroke/TIA No 0   V Vascular Disease No 0   A Age 74-69 Yes,  (71 y.o.) 1   Sc Sex male 0    [id number]  Score  1   Score last updated 2/2/23 10:08 AM EST    Physical Exam:   /74 (Site: Left Upper Arm, Position: Sitting)   Pulse 58   Ht 5' 8\" (1.727 m)   Wt (!) 311 lb (141.1 kg)   SpO2 98%   BMI 47.29 kg/m²   No intake or output data in the 24 hours ending 02/02/23 1008  Wt Readings from Last 2 Encounters:   02/02/23 (!) 311 lb (141.1 kg)   03/03/22 294 lb (133.4 kg)     Constitutional: He is oriented to person, place, and time. He appears morbidly obese. In no acute distress. Head: Normocephalic and atraumatic. Neck: Neck supple. No JVD present. Carotid bruit is not present. No mass and no thyromegaly present. No lymphadenopathy present. Cardiovascular: Regular but kenyatta, normal heart sounds and intact distal pulses. Exam reveals no gallop and no friction rub. No murmur heard. Pulmonary/Chest: Effort normal and breath sounds normal. No respiratory distress. He has no wheezes, rhonchi or rales. Abdominal: Soft, non-tender. Bowel sounds and aorta are normal. He exhibits no organomegaly, mass or bruit. Extremities: No edema, cyanosis, or clubbing. Pulses are 2+ radial/carotid/dorsalis pedis and posterior tibial bilaterally. Neurological: He is alert and oriented to person, place, and time. He has normal reflexes. No cranial nerve deficit. Coordination normal.   Skin: Skin is warm and dry. There is no rash or diaphoresis. Psychiatric: He has a normal mood and affect. His speech is normal and behavior is normal.     Personally reviewed and interpreted   EKG Interpretation 6/27/18: Atrial fibrillation with RVR, poor R wave progression  EKG Interpretation 1/16/19: Sinus bradycardia with first degree AV block  EKG Interpretation 7/22/19: Atrial fibrillation  EKG Interpretation 7/2/20: Sinus rhythm  EKG Interpretation 1/7/21: Sinus bradycardia   EKG Interpretation 7/29/21: Sinus bradycardia with normal axis  EKG Interpretation 2/2/23: Sinus bradycardia. First degree A-V block     Procedures:     DCCV 7/24/18  Successful synchronized cardioversion from atrial fibrillation  to a normal sinus rhythm with a single 200 J biphasic synchronized  cardioversion shock.     DCCV 8/6/19  Successful synchronized cardioversion from atrial  fibrillation to sinus bradycardia with first-degree AV block using a  single biphasic cardioversion shock. Imaging:     Echo 7/23/18  Normal LV size; Estimated ejection fraction is 45-50%. Cannot comment about  wall motion abn. Left atrium is of normal size. Right ventricular systolic function is normal.     Lab Review:   Lab Results   Component Value Date/Time    TRIG 89 06/21/2018 02:13 PM    HDL 57 06/21/2018 02:13 PM    LDLCALC 109 06/21/2018 02:13 PM    LABVLDL 18 06/21/2018 02:13 PM     Lab Results   Component Value Date/Time     03/03/2022 10:59 AM    K 4.2 03/03/2022 10:59 AM    BUN 28 03/03/2022 10:59 AM    CREATININE 1.3 03/03/2022 10:59 AM     Assessment:  1. Atrial fibrillation, paroxysmal   2. Morbid obesity   3. DIANE, on CPAP therapy    Plan:  He continues in a regular rhythm today by EKG and will remain on amiodarone therapy. He is not currently anticoagulated due to his low CHADS Vasc score. Will continue to monitor lab work with chronic amiodarone therapy. I have encouraged him to improve dietary choices and to increase his aerobic activity as tolerated. I have personally reviewed all previous testing for this visit today including imaging, lab results and EKG as detailed above. We discussed a referral to electrophysiology to discuss an ablation but at this point he would prefer to remain on medical therapy and avoid any invasive procedures. I will see him in the office for follow up in 1 year    This note was scribed in the presence of Dr Glynn Moreno MD by Jeanie Real RN. Physician Attestation:  The scribes documentation has been prepared under my direction and personally reviewed by me in its entirety. I, Dr. Glynn Moreno personally performed the services described in this documentation as scribed by my RN in my presence, and I confirm that the note above accurately reflects all work, treatment, procedures, and medical decision making performed by me.

## 2023-02-02 ENCOUNTER — OFFICE VISIT (OUTPATIENT)
Dept: CARDIOLOGY CLINIC | Age: 73
End: 2023-02-02
Payer: MEDICARE

## 2023-02-02 VITALS
HEIGHT: 68 IN | BODY MASS INDEX: 47.13 KG/M2 | DIASTOLIC BLOOD PRESSURE: 74 MMHG | OXYGEN SATURATION: 98 % | HEART RATE: 58 BPM | SYSTOLIC BLOOD PRESSURE: 122 MMHG | WEIGHT: 311 LBS

## 2023-02-02 DIAGNOSIS — E66.01 MORBID OBESITY (HCC): ICD-10-CM

## 2023-02-02 DIAGNOSIS — I48.0 PAROXYSMAL ATRIAL FIBRILLATION (HCC): Primary | ICD-10-CM

## 2023-02-02 DIAGNOSIS — G47.33 OSA (OBSTRUCTIVE SLEEP APNEA): ICD-10-CM

## 2023-02-02 PROCEDURE — 93000 ELECTROCARDIOGRAM COMPLETE: CPT | Performed by: INTERNAL MEDICINE

## 2023-02-02 PROCEDURE — 1123F ACP DISCUSS/DSCN MKR DOCD: CPT | Performed by: INTERNAL MEDICINE

## 2023-02-02 PROCEDURE — G8427 DOCREV CUR MEDS BY ELIG CLIN: HCPCS | Performed by: INTERNAL MEDICINE

## 2023-02-02 PROCEDURE — G8417 CALC BMI ABV UP PARAM F/U: HCPCS | Performed by: INTERNAL MEDICINE

## 2023-02-02 PROCEDURE — 3017F COLORECTAL CA SCREEN DOC REV: CPT | Performed by: INTERNAL MEDICINE

## 2023-02-02 PROCEDURE — G8484 FLU IMMUNIZE NO ADMIN: HCPCS | Performed by: INTERNAL MEDICINE

## 2023-02-02 PROCEDURE — 1036F TOBACCO NON-USER: CPT | Performed by: INTERNAL MEDICINE

## 2023-02-02 PROCEDURE — 99214 OFFICE O/P EST MOD 30 MIN: CPT | Performed by: INTERNAL MEDICINE

## 2023-03-05 DIAGNOSIS — I48.0 PAROXYSMAL ATRIAL FIBRILLATION (HCC): ICD-10-CM

## 2023-03-07 RX ORDER — FUROSEMIDE 40 MG/1
40 TABLET ORAL DAILY
Qty: 90 TABLET | Refills: 3 | Status: SHIPPED | OUTPATIENT
Start: 2023-03-07

## 2023-06-01 DIAGNOSIS — I48.0 PAROXYSMAL ATRIAL FIBRILLATION (HCC): ICD-10-CM

## 2023-06-06 RX ORDER — AMIODARONE HYDROCHLORIDE 200 MG/1
TABLET ORAL
Qty: 90 TABLET | Refills: 3 | Status: SHIPPED | OUTPATIENT
Start: 2023-06-06

## 2023-06-06 NOTE — TELEPHONE ENCOUNTER
Last OV: 2/2/23  Next OV: X due yearly  Last refill: 3/22/22  #90  3 R/F  Most recent Labs: 3/3/22  Last EKG (if needed): 2/2/23

## 2023-06-22 ENCOUNTER — TELEPHONE (OUTPATIENT)
Dept: FAMILY MEDICINE CLINIC | Age: 73
End: 2023-06-22

## 2023-06-22 DIAGNOSIS — Z13.6 SCREENING FOR AAA (ABDOMINAL AORTIC ANEURYSM): Primary | ICD-10-CM

## 2023-06-22 NOTE — TELEPHONE ENCOUNTER
Internal Medicine Progress Note    SUBJECTIVE:   C/o mild improvement in dyspnea. C/o orthopnea.     OBJECTIVE:  I/O's    Intake/Output Summary (Last 24 hours) at 3/22/2022 0822  Last data filed at 3/22/2022 0643  Gross per 24 hour   Intake 300 ml   Output 1600 ml   Net -1300 ml     Last Recorded Vitals  Visit Vitals  /66 (BP Location: LUE - Left upper extremity)   Pulse 69   Temp 97.5 °F (36.4 °C) (Oral)   Resp 16   Ht 5' 4\" (1.626 m)   Wt 62.4 kg (137 lb 9.1 oz)   SpO2 97%   BMI 23.61 kg/m²        Physicial Exam:  GENERAL:  In no apparent distress.     CHEST: bilateral rales  CARDIAC:  Regular rate and rhythm.   normal S1 and S2, without murmurs, gallops, or rubs.  ABDOMEN:  Soft, No sign of distention.  no tenderness. No  rebound or guarding, and no masses palpated.   Bowel Sounds normal.  EXTREMITIES:  1+ pitting edema BLE.   NEUROLOGIC EXAM:  Alert and oriented x 3. CN 2-12 intact.  No focal sensory or strength deficits.   Speech normal.  Follows commands.    Imaging  XR CHEST PA OR AP 1 VIEW    Result Date: 3/21/2022  EXAM: XR CHEST PA OR AP 1 VIEW CLINICAL INDICATION: Chest pain, dyspnea COMPARISON: 02/11/2022 FINDINGS: See impression      Sternotomy wires.  Normal heart size.  Atherosclerotic aorta.  Prominent bronchovascular and interstitial markings with few scattered ill-defined opacities.  Small bilateral effusions.  Thickening minor fissure.  Findings are worsened when compared to prior study.  Consider fluid overload with other processes not excluded. Electronically Signed by: NBA RINCON M.D. Signed on: 3/21/2022 10:00 AM         Labs   Recent Results (from the past 24 hour(s))   Electrocardiogram 12-Lead    Collection Time: 03/21/22  9:10 AM   Result Value Ref Range    Ventricular Rate EKG/Min (BPM) 74     Atrial Rate (BPM) 74     VA-Interval (MSEC) 180     QRS-Interval (MSEC) 84     QT-Interval (MSEC) 424     QTc 470     P Axis (Degrees) -6     R Axis (Degrees) 28     T Axis (Degrees)  Unable to reach patient, lvm to call office back 49     REPORT TEXT       Normal sinus rhythm  Septal infarct  , age undetermined  Abnormal ECG  When compared with ECG of  02-MAR-2022 10:23,  premature ventricular complexes  are no longer  present  CO interval  has decreased  Septal infarct  is now  present  Nonspecific T wave abnormality, improved in  Inferior leads  Nonspecific T wave abnormality now evident in  Anterior leads  Confirmed by RAMON FRIEDMAN, [facility] ([id number]) on 3/21/2022 8:43:12 PM     CBC with Automated Differential (performable only)    Collection Time: 03/21/22  9:24 AM   Result Value Ref Range    WBC 6.9 4.2 - 11.0 K/mcL    RBC 4.72 4.00 - 5.20 mil/mcL    HGB 14.4 12.0 - 15.5 g/dL    HCT 42.9 36.0 - 46.5 %    MCV 90.9 78.0 - 100.0 fl    MCH 30.5 26.0 - 34.0 pg    MCHC 33.6 32.0 - 36.5 g/dL    RDW-CV 15.2 (H) 11.0 - 15.0 %    RDW-SD 50.3 (H) 39.0 - 50.0 fL     140 - 450 K/mcL    NRBC 0 <=0 /100 WBC    Neutrophil, Percent 74 %    Lymphocytes, Percent 17 %    Mono, Percent 8 %    Eosinophils, Percent 0 %    Basophils, Percent 1 %    Immature Granulocytes 0 %    Absolute Neutrophils 5.1 1.8 - 7.7 K/mcL    Absolute Lymphocytes 1.2 1.0 - 4.0 K/mcL    Absolute Monocytes 0.6 0.3 - 0.9 K/mcL    Absolute Eosinophils  0.0 0.0 - 0.5 K/mcL    Absolute Basophils 0.0 0.0 - 0.3 K/mcL    Absolute Immmature Granulocytes 0.0 0.0 - 0.2 K/mcL   Red Top    Collection Time: 03/21/22  9:24 AM   Result Value Ref Range    Extra Tube Hold for Add Ons    Comprehensive Metabolic Panel    Collection Time: 03/21/22  9:25 AM   Result Value Ref Range    Fasting Status      Sodium 140 135 - 145 mmol/L    Potassium 3.8 3.4 - 5.1 mmol/L    Chloride 104 98 - 107 mmol/L    Carbon Dioxide 29 21 - 32 mmol/L    Anion Gap 11 10 - 20 mmol/L    Glucose 99 70 - 99 mg/dL    BUN 13 6 - 20 mg/dL    Creatinine 0.81 0.51 - 0.95 mg/dL    Glomerular Filtration Rate 65 >=60    BUN/ Creatinine Ratio 16 7 - 25    Calcium 9.1 8.4 - 10.2 mg/dL    Bilirubin, Total 1.8 (H) 0.2 - 1.0 mg/dL    GOT/AST 30  <=37 Units/L    GPT/ALT 21 <64 Units/L    Alkaline Phosphatase 71 45 - 117 Units/L    Albumin 3.7 3.6 - 5.1 g/dL    Protein, Total 7.2 6.4 - 8.2 g/dL    Globulin 3.5 2.0 - 4.0 g/dL    A/G Ratio 1.1 1.0 - 2.4   TROPONIN I, HIGH SENSITIVITY    Collection Time: 03/21/22  9:25 AM   Result Value Ref Range    Troponin I, High Sensitivity 21 <52 ng/L   Light Blue Top    Collection Time: 03/21/22  9:25 AM   Result Value Ref Range    Extra Tube Hold for Add Ons    NT proBNP    Collection Time: 03/21/22  9:25 AM   Result Value Ref Range    NT-proBNP 3,021 (H) <=450 pg/mL   Rapid SARS-CoV-2 by PCR    Collection Time: 03/21/22 12:47 PM    Specimen: Nasal, Mid-turbinate; Swab   Result Value Ref Range    Rapid SARS-COV-2 by PCR Not Detected Not Detected / Detected / Presumptive Positive / Inhibitors present    Isolation Guidelines      Procedural Comment     Magnesium    Collection Time: 03/22/22  4:47 AM   Result Value Ref Range    Magnesium 2.0 1.7 - 2.4 mg/dL   Thyroid Stimulating Hormone Reflex    Collection Time: 03/22/22  4:47 AM   Result Value Ref Range    TSH 5.055 (H) 0.350 - 5.000 mcUnits/mL   Basic Metabolic Panel    Collection Time: 03/22/22  4:47 AM   Result Value Ref Range    Fasting Status      Sodium 140 135 - 145 mmol/L    Potassium 3.8 3.4 - 5.1 mmol/L    Chloride 103 98 - 107 mmol/L    Carbon Dioxide 32 21 - 32 mmol/L    Anion Gap 9 (L) 10 - 20 mmol/L    Glucose 91 70 - 99 mg/dL    BUN 14 6 - 20 mg/dL    Creatinine 0.89 0.51 - 0.95 mg/dL    Glomerular Filtration Rate 58 (L) >=60    BUN/ Creatinine Ratio 16 7 - 25    Calcium 9.1 8.4 - 10.2 mg/dL   CBC with Automated Differential (performable only)    Collection Time: 03/22/22  4:47 AM   Result Value Ref Range    WBC 7.6 4.2 - 11.0 K/mcL    RBC 4.44 4.00 - 5.20 mil/mcL    HGB 13.3 12.0 - 15.5 g/dL    HCT 40.2 36.0 - 46.5 %    MCV 90.5 78.0 - 100.0 fl    MCH 30.0 26.0 - 34.0 pg    MCHC 33.1 32.0 - 36.5 g/dL    RDW-CV 15.1 (H) 11.0 - 15.0 %    RDW-SD 49.8 39.0 -  50.0 fL     140 - 450 K/mcL    NRBC 0 <=0 /100 WBC    Neutrophil, Percent 68 %    Lymphocytes, Percent 21 %    Mono, Percent 10 %    Eosinophils, Percent 0 %    Basophils, Percent 1 %    Immature Granulocytes 0 %    Absolute Neutrophils 5.1 1.8 - 7.7 K/mcL    Absolute Lymphocytes 1.6 1.0 - 4.0 K/mcL    Absolute Monocytes 0.8 0.3 - 0.9 K/mcL    Absolute Eosinophils  0.0 0.0 - 0.5 K/mcL    Absolute Basophils 0.0 0.0 - 0.3 K/mcL    Absolute Immmature Granulocytes 0.0 0.0 - 0.2 K/mcL   Free T4    Collection Time: 03/22/22  4:47 AM   Result Value Ref Range    T4, Free 1.1 0.8 - 1.5 ng/dL     Microbiology Results     None        Inpatient Meds:  • atorvastatin  40 mg Oral QHS   • aspirin  81 mg Oral Daily   • calcium carbonate-vitamin D  1 tablet Oral Daily with breakfast   • sodium chloride (PF)  2 mL Intracatheter 2 times per day   • enoxaparin  40 mg Subcutaneous Nightly   • Potassium Standard Replacement Protocol   Does not apply See Admin Instructions   • Magnesium Standard Replacement Protocol   Does not apply See Admin Instructions   • furosemide  40 mg Intravenous BID   • hydrALAZINE  10 mg Oral TID   • isosorbide dinitrate  5 mg Oral TID     sodium chloride, sodium chloride, sodium chloride, acetaminophen, docusate sodium-sennosides    Assessment/Plan  #Acute decompensation of diastolic HF  #Acute hypoxic respiratory failure  #Severe Critical AS  #Mod to Severe MR  #Hypertensive heart dx  #RO Pleural Effusions  #HLD  -inpatient tele  -personally reviewed labs, notes and imaging  -d/w ER  -cards consulted  -continue diuresis  -avoid BB and CCB due to critical AS  -accurate I/O, daily wts  -covid neg  -EKG NSR  -CXR Prominent bronchovascular and interstitial markings with few scattered ill-defined opacities.  Small bilateral effusions.   -plan of care reviewed with patient and son at bedside  03/22: cardiology on consult. Added hydralazine,imdur. Held beta blocker, taper oxygen. On oxygen 2 L/NC. To be seen  by structural heart consult     DVT Prophylaxis  Current Active Medications for DVT Prophylaxis (From admission, onward)         Stop     enoxaparin (LOVENOX) injection 40 mg  40 mg,   Subcutaneous,   NIGHTLY         --              Disposition:  Code Status:  Code Status: Selective Treatment/DNR  IP Consult Orders (From admission, onward)             Start     Ordered    03/21/22 1451  Inpatient consult to Cardiology  ONE TIME        Provider:  Austin Doyle MD    03/21/22 1456    03/21/22 1235  Inpatient consult to Intensivist  ONE TIME        Provider:  Derek Pineda MD    03/21/22 1235    03/21/22 1210  Inpatient consult to Cardiology  ONE TIME        Provider:  Austin Doyle MD    03/21/22 1210                PCP: MD Obdulio Dahl MD  3/22/2022 8:22 AM

## 2023-06-22 NOTE — TELEPHONE ENCOUNTER
Please notify patient that it has been 5 years since his abdominal US screen for aneurysm. It was at the upper limits of normal and should now be repeated. I have placed the order. Central scheduling will call him.

## 2023-07-22 ENCOUNTER — HOSPITAL ENCOUNTER (OUTPATIENT)
Dept: ULTRASOUND IMAGING | Age: 73
Discharge: HOME | End: 2023-07-22
Attending: FAMILY MEDICINE
Payer: MEDICARE

## 2023-07-22 DIAGNOSIS — Z13.6 SCREENING FOR AAA (ABDOMINAL AORTIC ANEURYSM): ICD-10-CM

## 2023-07-22 PROCEDURE — 76775 US EXAM ABDO BACK WALL LIM: CPT

## 2023-07-23 PROBLEM — I71.40 AAA (ABDOMINAL AORTIC ANEURYSM) (HCC): Status: ACTIVE | Noted: 2023-07-01

## 2023-08-25 ENCOUNTER — HOSPITAL ENCOUNTER (EMERGENCY)
Age: 73
Discharge: HOME | End: 2023-08-25
Attending: EMERGENCY MEDICINE
Payer: MEDICARE

## 2023-08-25 ENCOUNTER — TELEPHONE (OUTPATIENT)
Dept: FAMILY MEDICINE CLINIC | Age: 73
End: 2023-08-25

## 2023-08-25 ENCOUNTER — APPOINTMENT (OUTPATIENT)
Dept: GENERAL RADIOLOGY | Age: 73
End: 2023-08-25
Payer: MEDICARE

## 2023-08-25 VITALS
RESPIRATION RATE: 16 BRPM | HEART RATE: 54 BPM | DIASTOLIC BLOOD PRESSURE: 72 MMHG | SYSTOLIC BLOOD PRESSURE: 146 MMHG | HEIGHT: 67 IN | BODY MASS INDEX: 49.44 KG/M2 | TEMPERATURE: 97.8 F | WEIGHT: 315 LBS | OXYGEN SATURATION: 96 %

## 2023-08-25 DIAGNOSIS — I83.92 VARICOSE VEINS OF LEFT LOWER EXTREMITY, UNSPECIFIED WHETHER COMPLICATED: Primary | ICD-10-CM

## 2023-08-25 PROCEDURE — 99283 EMERGENCY DEPT VISIT LOW MDM: CPT

## 2023-08-25 PROCEDURE — 73630 X-RAY EXAM OF FOOT: CPT

## 2023-08-25 RX ORDER — ATORVASTATIN CALCIUM 10 MG/1
10 TABLET, FILM COATED ORAL DAILY
Qty: 90 TABLET | Refills: 1 | Status: SHIPPED | OUTPATIENT
Start: 2023-08-25 | End: 2023-08-30

## 2023-08-25 RX ORDER — ASPIRIN 81 MG/1
81 TABLET ORAL DAILY
Qty: 90 TABLET | Refills: 1 | Status: SHIPPED | OUTPATIENT
Start: 2023-08-25 | End: 2023-08-30

## 2023-08-25 ASSESSMENT — ENCOUNTER SYMPTOMS: ROS SKIN COMMENTS: AS STATED IN HPI

## 2023-08-25 NOTE — ED TRIAGE NOTES
Patient to ED via private vehicle for left foot and toe pain. Patient is alert and oriented with GCS 15. Patient complains of \"tightness\" in his left foot and toes for the past 2 days. Patient describes the pain as \"minimal\" but is concerned because today there is a \"purple\" discoloration to his toes and posterior foot. Patient has +PMS to foot, capillary refill WNL and skin is warm to the touch with +pedal pulse. Patient denies any recent injury as possible cause. Patient denies any other complaints at this time.

## 2023-08-25 NOTE — ED PROVIDER NOTES
Emergency Department Attending Provider Note  Location: [facility] EMERGENCY DEPARTMENT  8/25/2023   Note Started: 5:46 PM EDT 8/25/23       Patient Identification  Agustin Das is a 72 y.o. male      HPI:Raza Nxi was evaluated in the Emergency Department for engorged veins in both feet and paresthesias. Symptoms have been there for the past 3 days. He did call his primary care provider who recommended he come to the emergency department. The patient does not have any chest pain or abdominal pain. Although initial history and physical exam information was obtained by GIOVANY/NPP/MD/ (who also dictated a record of this visit), I personally saw the patient and performed a substantive portion of the visit including all aspects of the medical decision making. has a past medical history of AAA (abdominal aortic aneurysm) ([address]), Chronic atrial fibrillation ([address]), Former smoker, Morbid obesity due to excess calories ([address]), Obstructive sleep apnea, Pilonidal cyst, and Primary osteoarthritis involving multiple joints. PHYSICAL EXAM:    Physical Exam  Vitals and nursing note reviewed. Constitutional:       Appearance: He is well-developed. He is not diaphoretic. HENT:      Head: Normocephalic and atraumatic. Right Ear: External ear normal.      Left Ear: External ear normal.   Eyes:      General: No scleral icterus. Right eye: No discharge. Left eye: No discharge. Conjunctiva/sclera: Conjunctivae normal.   Neck:      Trachea: No tracheal deviation. Cardiovascular:      Rate and Rhythm: Regular rhythm. Bradycardia present. Heart sounds: Normal heart sounds. Pulmonary:      Effort: Pulmonary effort is normal. No respiratory distress. Breath sounds: No stridor. Musculoskeletal:         General: Normal range of motion. Cervical back: Normal range of motion. Comments: He does have several varicosities along the dorsal aspect of both feet.

## 2023-08-25 NOTE — TELEPHONE ENCOUNTER
Pt Bi-lateral toes are tight, no pain. Pt bi-lateral bottom feet is purple, pt has a purple vein on top of left foot. I advise pt to go to ER.

## 2023-08-25 NOTE — ED PROVIDER NOTES
[address] [facility] [address]  Dept: [phone]  Loc: [phone]  eMERGENCYdEPARTMENT eNCOUnter      Pt Name: Elkin Prieto  MRN: [id number]  [address] [facility] 1950  Date of evaluation: 8/25/2023  Provider:VANDANA Raymond CNP    Seen and evaluated by MD and advanced practice provider  CHIEF COMPLAINT       Chief Complaint   Patient presents with    Foot Pain       CRITICAL CARE TIME       HISTORY OF PRESENT ILLNESS  (Location/Symptom, Timing/Onset, Context/Setting, Quality, Duration,Modifying Factors, Severity.)   Elkin Prieto is a 72 y.o. male who presents to the emergency department PMH DVT paroxysmal atrial fibrillation with 2 successful cardioversions  Morbid obesity, AAA, bilateral knee problems    Lives at home  Anticoagulation therapy none  CODE STATUS full  Social determinants: None identified    HPI provided by the patient    Patient presented to the emergency department with reports that he feels like the toes on his left foot are becoming more purplish in color and the tiny veins are popping out more. He feels like there is been some numbness. This is all been changing over the past week but more prominent in the last 2 days. He did reach out to his primary care who advised him to come to the emergency department. Patient self discloses that he is not very ambulatory due to his chronic knee pain. Cardiologist is Dr. Patel   last seen in February 2023, note confirms currently no anticoagulation therapy      Nursing Notes were reviewedand agreed with or any disagreements were addressed in the HPI. REVIEW OF SYSTEMS    (2-9 systems for level 4, 10 or more for level 5)     Review of Systems   Skin:         As stated in HPI     Except as noted above the remainder of the review of systems was reviewed and negative.        PAST MEDICAL HISTORY         Diagnosis Date    AAA (abdominal aortic

## 2023-08-25 NOTE — ED NOTES
Discharge and education instructions reviewed. Patient verbalized understanding, teach-back successful. Patient denied questions at this time. No acute distress noted. Patient instructed to follow-up as noted - return to emergency department if symptoms worsen. Patient verbalized understanding. Discharged per EDMD with discharge instructions.         Doreen Bird RN  08/25/23 0208

## 2023-08-26 NOTE — MANAGEMENT PLAN
Prescriptions called into pt's preferred pharmacy of [facility] @ [address] @ [phone].     RX: Lipitor and ASA per pt's discharge instructions

## 2023-08-29 PROBLEM — Z86.79 HISTORY OF ATRIAL FIBRILLATION: Status: ACTIVE | Noted: 2018-06-21

## 2023-08-29 PROBLEM — E78.00 HYPERCHOLESTEROLEMIA: Status: ACTIVE | Noted: 2023-08-29

## 2023-08-30 ENCOUNTER — OFFICE VISIT (OUTPATIENT)
Dept: FAMILY MEDICINE CLINIC | Age: 73
End: 2023-08-30

## 2023-08-30 VITALS
WEIGHT: 315 LBS | SYSTOLIC BLOOD PRESSURE: 124 MMHG | HEIGHT: 67 IN | BODY MASS INDEX: 49.44 KG/M2 | DIASTOLIC BLOOD PRESSURE: 80 MMHG

## 2023-08-30 DIAGNOSIS — I73.9 PERIPHERAL ARTERIAL DISEASE (HCC): ICD-10-CM

## 2023-08-30 DIAGNOSIS — L85.9 HYPERKERATOSIS: ICD-10-CM

## 2023-08-30 DIAGNOSIS — E66.01 MORBID OBESITY DUE TO EXCESS CALORIES (HCC): ICD-10-CM

## 2023-08-30 DIAGNOSIS — I71.40 ABDOMINAL AORTIC ANEURYSM (AAA) WITHOUT RUPTURE, UNSPECIFIED PART (HCC): ICD-10-CM

## 2023-08-30 DIAGNOSIS — Z86.79 HISTORY OF ATRIAL FIBRILLATION: Primary | ICD-10-CM

## 2023-08-30 DIAGNOSIS — E78.00 HYPERCHOLESTEROLEMIA: ICD-10-CM

## 2023-08-30 DIAGNOSIS — G47.33 OBSTRUCTIVE SLEEP APNEA: ICD-10-CM

## 2023-08-30 ASSESSMENT — PATIENT HEALTH QUESTIONNAIRE - PHQ9
2. FEELING DOWN, DEPRESSED OR HOPELESS: 0
SUM OF ALL RESPONSES TO PHQ QUESTIONS 1-9: 0
SUM OF ALL RESPONSES TO PHQ9 QUESTIONS 1 & 2: 0
SUM OF ALL RESPONSES TO PHQ QUESTIONS 1-9: 0
SUM OF ALL RESPONSES TO PHQ QUESTIONS 1-9: 0
1. LITTLE INTEREST OR PLEASURE IN DOING THINGS: 0
SUM OF ALL RESPONSES TO PHQ QUESTIONS 1-9: 0

## 2023-08-31 ENCOUNTER — TELEPHONE (OUTPATIENT)
Dept: VASCULAR SURGERY | Age: 73
End: 2023-08-31

## 2023-09-08 ENCOUNTER — HOSPITAL ENCOUNTER (OUTPATIENT)
Dept: VASCULAR LAB | Age: 73
Discharge: HOME | End: 2023-09-08
Payer: MEDICARE

## 2023-09-08 DIAGNOSIS — I83.92 VARICOSE VEINS OF LEFT LOWER EXTREMITY, UNSPECIFIED WHETHER COMPLICATED: ICD-10-CM

## 2023-09-08 PROCEDURE — 93971 EXTREMITY STUDY: CPT

## 2023-10-26 ENCOUNTER — OFFICE VISIT (OUTPATIENT)
Dept: VASCULAR SURGERY | Age: 73
End: 2023-10-26
Payer: MEDICARE

## 2023-10-26 VITALS — HEIGHT: 67 IN | BODY MASS INDEX: 49.44 KG/M2 | WEIGHT: 315 LBS | HEART RATE: 55 BPM

## 2023-10-26 DIAGNOSIS — R23.0 TOE CYANOSIS: Primary | ICD-10-CM

## 2023-10-26 DIAGNOSIS — I73.00 RAYNAUD'S PHENOMENON WITHOUT GANGRENE: ICD-10-CM

## 2023-10-26 PROCEDURE — G8427 DOCREV CUR MEDS BY ELIG CLIN: HCPCS | Performed by: SURGERY

## 2023-10-26 PROCEDURE — 1036F TOBACCO NON-USER: CPT | Performed by: SURGERY

## 2023-10-26 PROCEDURE — 99203 OFFICE O/P NEW LOW 30 MIN: CPT | Performed by: SURGERY

## 2023-10-26 PROCEDURE — 3017F COLORECTAL CA SCREEN DOC REV: CPT | Performed by: SURGERY

## 2023-10-26 PROCEDURE — 1123F ACP DISCUSS/DSCN MKR DOCD: CPT | Performed by: SURGERY

## 2023-10-26 PROCEDURE — G8417 CALC BMI ABV UP PARAM F/U: HCPCS | Performed by: SURGERY

## 2023-10-26 PROCEDURE — G8484 FLU IMMUNIZE NO ADMIN: HCPCS | Performed by: SURGERY

## 2023-10-26 ASSESSMENT — ENCOUNTER SYMPTOMS
ALLERGIC/IMMUNOLOGIC NEGATIVE: 1
GASTROINTESTINAL NEGATIVE: 1
EYES NEGATIVE: 1
RESPIRATORY NEGATIVE: 1
COLOR CHANGE: 1

## 2023-10-26 NOTE — PROGRESS NOTES
Subjective:      Patient ID: Jeff Jaramillo is a 72 y.o. male. HPI Referral from [facility] for venous evaluation of the legs after the patient presented to [facility] emergency room in early September because of noticeable bluish discoloration of his left foot. He was worked up with a duplex scan which was negative for DVT. The symptoms are not associated with any pain swelling ulceration or bleeding. He does notice some bluish discoloration on the right foot. This discoloration waxes and wanes and recently has been better. No history of venous intervention. No history of compression stocking use. Past Medical History:   Diagnosis Date    AAA (abdominal aortic aneurysm) ([address]) 07/2023    2.9 cm / Repeat in 5 years. Former smoker     Quit 2007    History of atrial fibrillation 06/21/2018    Dr. Martinez Cisneros / Cardioversion 7-24-18; DCCV 8/2019    Hypercholesterolemia     Morbid obesity due to excess calories ([address])     Obstructive sleep apnea 07/2018    CPAP per Dr. Sixto Dooley    Pilonidal cyst     Primary osteoarthritis involving multiple joints      Past Surgical History:   Procedure Laterality Date    AXILLARY SURGERY Right     Cyst Excision    CARDIOVERSION  07/24/2018    Dr. Martinez Cisneros    COLONOSCOPY  10/16/2015    CYST REMOVAL Bilateral     Groin    KNEE ARTHROSCOPY Left     PILONIDAL CYST EXCISION      Multiple    WISDOM TOOTH EXTRACTION       No Known Allergies  Current Outpatient Medications   Medication Sig Dispense Refill    amiodarone (CORDARONE) 200 MG tablet TAKE 1/2 TABLET BY MOUTH MONDAY, WEDNESDAY, FRIDAY AND TAKE 1 TABLET ON ALL OTHER DAYS 90 tablet 3    furosemide (LASIX) 40 MG tablet TAKE 1 TABLET BY MOUTH DAILY 90 tablet 3     No current facility-administered medications for this visit. Social History     Socioeconomic History    Marital status:       Spouse name: Not on file    Number of children: 2    Years of education: Not on file    Highest education level: Not on file

## 2023-10-29 NOTE — TELEPHONE ENCOUNTER
Patient needs his handicap vehicle permit renewed. Please contact patient when available for . Patient aware MD out until 11/9/18. no...

## 2023-12-21 DIAGNOSIS — I48.0 PAROXYSMAL ATRIAL FIBRILLATION (HCC): ICD-10-CM

## 2023-12-21 LAB — TSH SERPL DL<=0.005 MIU/L-ACNC: 1.32 UIU/ML (ref 0.27–4.2)

## 2024-02-04 NOTE — PROGRESS NOTES
1/16/19: Sinus bradycardia with first degree AV block  EKG Interpretation 7/22/19: Atrial fibrillation  EKG Interpretation 7/2/20: Sinus rhythm  EKG Interpretation 1/7/21: Sinus bradycardia   EKG Interpretation 7/29/21: Sinus bradycardia with normal axis  EKG Interpretation 2/2/23: Sinus bradycardia. First degree A-V block   EKG Interpretation 02/08/24: Sinus Bradycardia -First degree A-V block -frequent multiform ectopic ventricular beats   Procedures:     DCCV 7/24/18  Successful synchronized cardioversion from atrial fibrillation  to a normal sinus rhythm with a single 200 J biphasic synchronized  cardioversion shock.    DCCV 8/6/19  Successful synchronized cardioversion from atrial  fibrillation to sinus bradycardia with first-degree AV block using a  single biphasic cardioversion shock.    Imaging:     Echo 7/23/18  Normal LV size; Estimated ejection fraction is 45-50%. Cannot comment about  wall motion abn.  Left atrium is of normal size.  Right ventricular systolic function is normal.     Lab Review:   Lab Results   Component Value Date/Time    TRIG 89 06/21/2018 02:13 PM    HDL 57 06/21/2018 02:13 PM    LDLCALC 109 06/21/2018 02:13 PM     Lab Results   Component Value Date/Time     03/03/2022 10:59 AM    K 4.2 03/03/2022 10:59 AM    BUN 28 03/03/2022 10:59 AM    CREATININE 1.3 03/03/2022 10:59 AM     Assessment:  1. Atrial fibrillation, paroxysmal   2. Morbid obesity   3. DIANE, on CPAP therapy    Plan: last OV 02/23  He continues in a regular rhythm today by EKG and will remain on amiodarone therapy.  His last TSH was WNL in 12/2023. He is not currently anticoagulated due to his low CHADS Vasc score.  His EKG today remains Sinus Andrei.  He can take the lasix as needed for leg swelling. Will continue to monitor lab work with chronic amiodarone therapy. His blood pressure is mildly elevated to continue to monitor . I have ordered a CMP and CBC today. I have encouraged him to improve dietary choices and to

## 2024-02-08 ENCOUNTER — OFFICE VISIT (OUTPATIENT)
Dept: CARDIOLOGY CLINIC | Age: 74
End: 2024-02-08
Payer: MEDICARE

## 2024-02-08 VITALS
WEIGHT: 315 LBS | SYSTOLIC BLOOD PRESSURE: 120 MMHG | DIASTOLIC BLOOD PRESSURE: 98 MMHG | BODY MASS INDEX: 49.44 KG/M2 | OXYGEN SATURATION: 98 % | HEIGHT: 67 IN | HEART RATE: 56 BPM

## 2024-02-08 DIAGNOSIS — I48.0 PAF (PAROXYSMAL ATRIAL FIBRILLATION) (HCC): ICD-10-CM

## 2024-02-08 DIAGNOSIS — E66.01 MORBID OBESITY (HCC): ICD-10-CM

## 2024-02-08 DIAGNOSIS — I48.0 PAF (PAROXYSMAL ATRIAL FIBRILLATION) (HCC): Primary | ICD-10-CM

## 2024-02-08 DIAGNOSIS — G47.33 OSA ON CPAP: ICD-10-CM

## 2024-02-08 LAB
ALBUMIN SERPL-MCNC: 4 G/DL (ref 3.4–5)
ALBUMIN/GLOB SERPL: 1.5 {RATIO} (ref 1.1–2.2)
ALP SERPL-CCNC: 91 U/L (ref 40–129)
ALT SERPL-CCNC: 9 U/L (ref 10–40)
ANION GAP SERPL CALCULATED.3IONS-SCNC: 13 MMOL/L (ref 3–16)
AST SERPL-CCNC: 14 U/L (ref 15–37)
BILIRUB SERPL-MCNC: 0.3 MG/DL (ref 0–1)
BUN SERPL-MCNC: 30 MG/DL (ref 7–20)
CALCIUM SERPL-MCNC: 8.7 MG/DL (ref 8.3–10.6)
CHLORIDE SERPL-SCNC: 104 MMOL/L (ref 99–110)
CO2 SERPL-SCNC: 23 MMOL/L (ref 21–32)
CREAT SERPL-MCNC: 1.1 MG/DL (ref 0.8–1.3)
DEPRECATED RDW RBC AUTO: 13.5 % (ref 12.4–15.4)
GFR SERPLBLD CREATININE-BSD FMLA CKD-EPI: >60 ML/MIN/{1.73_M2}
GLUCOSE SERPL-MCNC: 81 MG/DL (ref 70–99)
HCT VFR BLD AUTO: 35.8 % (ref 40.5–52.5)
HGB BLD-MCNC: 11.8 G/DL (ref 13.5–17.5)
MCH RBC QN AUTO: 29.8 PG (ref 26–34)
MCHC RBC AUTO-ENTMCNC: 32.9 G/DL (ref 31–36)
MCV RBC AUTO: 90.7 FL (ref 80–100)
PLATELET # BLD AUTO: 240 K/UL (ref 135–450)
PMV BLD AUTO: 7.9 FL (ref 5–10.5)
POTASSIUM SERPL-SCNC: 4.2 MMOL/L (ref 3.5–5.1)
PROT SERPL-MCNC: 6.7 G/DL (ref 6.4–8.2)
RBC # BLD AUTO: 3.94 M/UL (ref 4.2–5.9)
SODIUM SERPL-SCNC: 140 MMOL/L (ref 136–145)
WBC # BLD AUTO: 7.2 K/UL (ref 4–11)

## 2024-02-08 PROCEDURE — 3017F COLORECTAL CA SCREEN DOC REV: CPT | Performed by: INTERNAL MEDICINE

## 2024-02-08 PROCEDURE — 1123F ACP DISCUSS/DSCN MKR DOCD: CPT | Performed by: INTERNAL MEDICINE

## 2024-02-08 PROCEDURE — 99214 OFFICE O/P EST MOD 30 MIN: CPT | Performed by: INTERNAL MEDICINE

## 2024-02-08 PROCEDURE — 1036F TOBACCO NON-USER: CPT | Performed by: INTERNAL MEDICINE

## 2024-02-08 PROCEDURE — G8427 DOCREV CUR MEDS BY ELIG CLIN: HCPCS | Performed by: INTERNAL MEDICINE

## 2024-02-08 PROCEDURE — G8417 CALC BMI ABV UP PARAM F/U: HCPCS | Performed by: INTERNAL MEDICINE

## 2024-02-08 PROCEDURE — G8484 FLU IMMUNIZE NO ADMIN: HCPCS | Performed by: INTERNAL MEDICINE

## 2024-02-08 PROCEDURE — 93000 ELECTROCARDIOGRAM COMPLETE: CPT | Performed by: INTERNAL MEDICINE

## 2024-02-29 DIAGNOSIS — I48.0 PAROXYSMAL ATRIAL FIBRILLATION (HCC): ICD-10-CM

## 2024-02-29 RX ORDER — FUROSEMIDE 40 MG/1
40 TABLET ORAL DAILY
Qty: 90 TABLET | Refills: 3 | Status: SHIPPED | OUTPATIENT
Start: 2024-02-29

## 2024-05-28 ENCOUNTER — APPOINTMENT (OUTPATIENT)
Dept: GENERAL RADIOLOGY | Age: 74
End: 2024-05-28
Payer: MEDICARE

## 2024-05-28 ENCOUNTER — HOSPITAL ENCOUNTER (EMERGENCY)
Age: 74
Discharge: HOME | End: 2024-05-28
Payer: MEDICARE

## 2024-05-28 VITALS
OXYGEN SATURATION: 99 % | HEIGHT: 67 IN | HEART RATE: 64 BPM | SYSTOLIC BLOOD PRESSURE: 157 MMHG | TEMPERATURE: 98.6 F | BODY MASS INDEX: 48.72 KG/M2 | RESPIRATION RATE: 16 BRPM | WEIGHT: 310.41 LBS | DIASTOLIC BLOOD PRESSURE: 78 MMHG

## 2024-05-28 DIAGNOSIS — S46.912A SHOULDER STRAIN, LEFT, INITIAL ENCOUNTER: Primary | ICD-10-CM
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
## 2024-05-28 PROCEDURE — 99283 EMERGENCY DEPT VISIT LOW MDM: CPT

## 2024-05-28 PROCEDURE — 73060 X-RAY EXAM OF HUMERUS: CPT

## 2024-05-28 PROCEDURE — 73080 X-RAY EXAM OF ELBOW: CPT

## 2024-05-28 ASSESSMENT — PAIN - FUNCTIONAL ASSESSMENT
PAIN_FUNCTIONAL_ASSESSMENT: 0-10

## 2024-05-28 ASSESSMENT — PAIN SCALES - GENERAL
PAINLEVEL_OUTOF10: 4
PAINLEVEL_OUTOF10: 6

## 2024-05-28 NOTE — DISCHARGE INSTRUCTIONS
X-ray of the left arm showing no bone abnormality such as fracture or dislocation.  Arthritic changes noted.  With the decreased ability to extend the left arm consider tendon or muscle injury.  Follow with [facility] later this week if not improved.  Recommend ibuprofen 40 mg 3 times a day.  Ice/heat may benefit.  Massage and gentle range of motion may benefit.  Discussed sling but this could add to a fall potential.

## 2024-05-28 NOTE — ED PROVIDER NOTES
Patient be discharged home with diagnosis left shoulder strain relating to fall occurring while shopping at local department store.  This was not a syncopal event.  This is mechanical in character.  He will use ibuprofen as he does not actually at home.  He does see [facility].  Recommend contact for appointment later in the week.  Otherwise follow-up with PCP.  The patient did express understanding of his diagnosis and his treatment plan.      I am the Primary Clinician of Record.  FINAL IMPRESSION      1. Shoulder strain, left, initial encounter    2. Fall, initial encounter          DISPOSITION/PLAN     DISPOSITION Decision To Discharge 05/28/2024 11:59:31 AM      PATIENT REFERRED TO:  [facility]    Schedule an appointment as soon as possible for a visit in 3 days      Rene Tavera DO  [address] [facility] [address] [facility]  [address]  [place] [address]  [phone]    Schedule an appointment as soon as possible for a visit       [facility] Emergency Department  [address] [facility] [address]  [phone]  Go to   If symptoms worsen      DISCHARGE MEDICATIONS:  New Prescriptions    No medications on file       DISCONTINUED MEDICATIONS:  Discontinued Medications    No medications on file              (Please note that portions of this note were completed with a voice recognition program.  Efforts were made to edit the dictations but occasionally words are mis-transcribed.)    Raza Mathew PA-C (electronically signed)        Raza Mathew PA-C  05/28/24 8329

## 2024-05-28 NOTE — ED TRIAGE NOTES
Rt flank pain ( pt reports this is common) and left arm pain (good perfusion, no loss of motion but movement hurts) post mechanical fall

## 2024-07-16 ENCOUNTER — OFFICE VISIT (OUTPATIENT)
Dept: FAMILY MEDICINE CLINIC | Age: 74
End: 2024-07-16
Payer: MEDICARE

## 2024-07-16 VITALS
BODY MASS INDEX: 46.62 KG/M2 | WEIGHT: 297 LBS | HEIGHT: 67 IN | DIASTOLIC BLOOD PRESSURE: 86 MMHG | SYSTOLIC BLOOD PRESSURE: 130 MMHG

## 2024-07-16 DIAGNOSIS — G47.33 OBSTRUCTIVE SLEEP APNEA: ICD-10-CM

## 2024-07-16 DIAGNOSIS — I50.22 CHRONIC SYSTOLIC (CONGESTIVE) HEART FAILURE (HCC): ICD-10-CM

## 2024-07-16 DIAGNOSIS — H10.32 ACUTE BACTERIAL CONJUNCTIVITIS OF LEFT EYE: ICD-10-CM

## 2024-07-16 DIAGNOSIS — I73.9 PERIPHERAL ARTERIAL DISEASE (HCC): ICD-10-CM

## 2024-07-16 DIAGNOSIS — E66.01 MORBID OBESITY DUE TO EXCESS CALORIES (HCC): ICD-10-CM

## 2024-07-16 DIAGNOSIS — I71.40 ABDOMINAL AORTIC ANEURYSM (AAA) WITHOUT RUPTURE, UNSPECIFIED PART (HCC): ICD-10-CM

## 2024-07-16 DIAGNOSIS — J06.9 VIRAL URI: ICD-10-CM

## 2024-07-16 DIAGNOSIS — Z86.79 HISTORY OF ATRIAL FIBRILLATION: Primary | ICD-10-CM

## 2024-07-16 PROCEDURE — G8417 CALC BMI ABV UP PARAM F/U: HCPCS | Performed by: FAMILY MEDICINE

## 2024-07-16 PROCEDURE — G8427 DOCREV CUR MEDS BY ELIG CLIN: HCPCS | Performed by: FAMILY MEDICINE

## 2024-07-16 PROCEDURE — 99213 OFFICE O/P EST LOW 20 MIN: CPT | Performed by: FAMILY MEDICINE

## 2024-07-16 PROCEDURE — 1123F ACP DISCUSS/DSCN MKR DOCD: CPT | Performed by: FAMILY MEDICINE

## 2024-07-16 PROCEDURE — 3017F COLORECTAL CA SCREEN DOC REV: CPT | Performed by: FAMILY MEDICINE

## 2024-07-16 PROCEDURE — 1036F TOBACCO NON-USER: CPT | Performed by: FAMILY MEDICINE

## 2024-07-16 RX ORDER — SULFACETAMIDE SODIUM 100 MG/ML
2 SOLUTION/ DROPS OPHTHALMIC 4 TIMES DAILY
Qty: 5 ML | Refills: 0 | Status: SHIPPED | OUTPATIENT
Start: 2024-07-16 | End: 2024-07-26

## 2024-07-16 RX ORDER — CODEINE PHOSPHATE/GUAIFENESIN 10-100MG/5
5 LIQUID (ML) ORAL 4 TIMES DAILY PRN
Qty: 140 ML | Refills: 0 | Status: SHIPPED | OUTPATIENT
Start: 2024-07-16 | End: 2024-07-23

## 2024-07-16 SDOH — ECONOMIC STABILITY: INCOME INSECURITY: HOW HARD IS IT FOR YOU TO PAY FOR THE VERY BASICS LIKE FOOD, HOUSING, MEDICAL CARE, AND HEATING?: NOT HARD AT ALL

## 2024-07-16 SDOH — ECONOMIC STABILITY: FOOD INSECURITY: WITHIN THE PAST 12 MONTHS, YOU WORRIED THAT YOUR FOOD WOULD RUN OUT BEFORE YOU GOT MONEY TO BUY MORE.: NEVER TRUE

## 2024-07-16 SDOH — ECONOMIC STABILITY: FOOD INSECURITY: WITHIN THE PAST 12 MONTHS, THE FOOD YOU BOUGHT JUST DIDN'T LAST AND YOU DIDN'T HAVE MONEY TO GET MORE.: NEVER TRUE

## 2024-07-16 SDOH — ECONOMIC STABILITY: HOUSING INSECURITY
IN THE LAST 12 MONTHS, WAS THERE A TIME WHEN YOU DID NOT HAVE A STEADY PLACE TO SLEEP OR SLEPT IN A SHELTER (INCLUDING NOW)?: NO

## 2024-07-16 ASSESSMENT — PATIENT HEALTH QUESTIONNAIRE - PHQ9
SUM OF ALL RESPONSES TO PHQ QUESTIONS 1-9: 0
1. LITTLE INTEREST OR PLEASURE IN DOING THINGS: NOT AT ALL
SUM OF ALL RESPONSES TO PHQ QUESTIONS 1-9: 0
SUM OF ALL RESPONSES TO PHQ QUESTIONS 1-9: 0
2. FEELING DOWN, DEPRESSED OR HOPELESS: NOT AT ALL
SUM OF ALL RESPONSES TO PHQ QUESTIONS 1-9: 0
SUM OF ALL RESPONSES TO PHQ9 QUESTIONS 1 & 2: 0

## 2024-07-16 ASSESSMENT — ENCOUNTER SYMPTOMS
SINUS PRESSURE: 0
RHINORRHEA: 1
SINUS PAIN: 0
SHORTNESS OF BREATH: 0
WHEEZING: 0
COUGH: 1
SORE THROAT: 0

## 2024-07-16 NOTE — PROGRESS NOTES
Assessment:      History of Atrial Fibrillation   Abdominal Aortic Aneurysm   Sleep Apnea   Obesity   Viral URI  Left Eye Conjunctivitis       Plan:   Assessment & Plan   Rx Tussi Organidin for cough  Rx Bleph 10 drops for the left eye.    I recommended the RSV, Tdap and COVID booster at the pharmacy.   Recommended that patient have an AWV.   RTO as needed         DUSTY SNELL, DO

## 2024-08-12 ENCOUNTER — TELEPHONE (OUTPATIENT)
Dept: CARDIOLOGY CLINIC | Age: 74
End: 2024-08-12

## 2024-08-12 NOTE — TELEPHONE ENCOUNTER
Called and spoke to patient about his symptoms. He says that he feels like he has increased fatigue in his legs. He has had 4 falls since February. He has been undergoing injections for arthritis knee pain. He says at times he does feel short of breath with exertion. Blood pressure stable when he monitors at home. He says that his shortness of breath is when he walks far and that is also when his legs give out. He is concerned that there is a correlation. I let him know that  I will discuss with MD and get back with him.

## 2024-08-12 NOTE — TELEPHONE ENCOUNTER
Discussed concerns with Dr Siddiqui. He thinks that the falls are due to the knee issue. Shortness of breath is most likely deconditioning. I called patient and encouraged him to follow up with provider for knee issues. Encouraged him to call back with any additional concerns. He verbalized  understanding.

## 2025-02-17 DIAGNOSIS — I48.0 PAROXYSMAL ATRIAL FIBRILLATION (HCC): ICD-10-CM

## 2025-02-17 RX ORDER — FUROSEMIDE 40 MG/1
40 TABLET ORAL DAILY
Qty: 90 TABLET | Refills: 3 | Status: SHIPPED | OUTPATIENT
Start: 2025-02-17

## 2025-02-17 NOTE — TELEPHONE ENCOUNTER
Last OV: 24 - [facility]  Next OV: 25 - [facility]  Last Labs: 24 - Patient aware Labs ordered and will get asap  Last EK24    Last Filled: 24 #90  1  Tab QD 3RF    [facility] has been filling but only seen Dr. Mohamud since 2018. Sent to [facility]

## 2025-03-06 DIAGNOSIS — I48.0 PAROXYSMAL ATRIAL FIBRILLATION (HCC): ICD-10-CM

## 2025-03-06 RX ORDER — AMIODARONE HYDROCHLORIDE 200 MG/1
TABLET ORAL
Qty: 90 TABLET | Refills: 5 | Status: SHIPPED | OUTPATIENT
Start: 2025-03-06

## 2025-03-28 DIAGNOSIS — I48.0 PAROXYSMAL ATRIAL FIBRILLATION (HCC): ICD-10-CM

## 2025-03-28 LAB
ALBUMIN SERPL-MCNC: 4.2 G/DL (ref 3.4–5)
ALBUMIN/GLOB SERPL: 1.6 {RATIO} (ref 1.1–2.2)
ALP SERPL-CCNC: 91 U/L (ref 40–129)
ALT SERPL-CCNC: 13 U/L (ref 10–40)
ANION GAP SERPL CALCULATED.3IONS-SCNC: 12 MMOL/L (ref 3–16)
AST SERPL-CCNC: 17 U/L (ref 15–37)
BASOPHILS # BLD: 0 K/UL (ref 0–0.2)
BASOPHILS NFR BLD: 0.5 %
BILIRUB SERPL-MCNC: 0.3 MG/DL (ref 0–1)
BUN SERPL-MCNC: 30 MG/DL (ref 7–20)
CALCIUM SERPL-MCNC: 8.7 MG/DL (ref 8.3–10.6)
CHLORIDE SERPL-SCNC: 107 MMOL/L (ref 99–110)
CO2 SERPL-SCNC: 25 MMOL/L (ref 21–32)
CREAT SERPL-MCNC: 1.3 MG/DL (ref 0.8–1.3)
DEPRECATED RDW RBC AUTO: 14 % (ref 12.4–15.4)
EOSINOPHIL # BLD: 0.2 K/UL (ref 0–0.6)
EOSINOPHIL NFR BLD: 5 %
GFR SERPLBLD CREATININE-BSD FMLA CKD-EPI: 58 ML/MIN/{1.73_M2}
GLUCOSE SERPL-MCNC: 75 MG/DL (ref 70–99)
HCT VFR BLD AUTO: 36.7 % (ref 40.5–52.5)
HGB BLD-MCNC: 12.1 G/DL (ref 13.5–17.5)
LYMPHOCYTES # BLD: 1.2 K/UL (ref 1–5.1)
LYMPHOCYTES NFR BLD: 26.9 %
MCH RBC QN AUTO: 30.3 PG (ref 26–34)
MCHC RBC AUTO-ENTMCNC: 33 G/DL (ref 31–36)
MCV RBC AUTO: 92.1 FL (ref 80–100)
MONOCYTES # BLD: 0.5 K/UL (ref 0–1.3)
MONOCYTES NFR BLD: 10.9 %
NEUTROPHILS # BLD: 2.4 K/UL (ref 1.7–7.7)
NEUTROPHILS NFR BLD: 56.7 %
PLATELET # BLD AUTO: 223 K/UL (ref 135–450)
PMV BLD AUTO: 8.1 FL (ref 5–10.5)
POTASSIUM SERPL-SCNC: 4.3 MMOL/L (ref 3.5–5.1)
PROT SERPL-MCNC: 6.8 G/DL (ref 6.4–8.2)
RBC # BLD AUTO: 3.99 M/UL (ref 4.2–5.9)
SODIUM SERPL-SCNC: 144 MMOL/L (ref 136–145)
WBC # BLD AUTO: 4.3 K/UL (ref 4–11)

## 2025-03-31 ENCOUNTER — RESULTS FOLLOW-UP (OUTPATIENT)
Dept: CARDIOLOGY CLINIC | Age: 75
End: 2025-03-31

## 2025-04-28 NOTE — PROGRESS NOTES
[facility] [place]    Raza Nix  1950    May 8, 2025    CC: \" Same ole same ole\"    HPI:  The patient is 74 y.o. male with past medical history of paroxsymal atrial fibrillation and morbid obesity. He underwent successful cardioversion on 7/24/18. He presented in atrial fibrillation in office on 7/22/19 and underwent successful cardioversion on 8/6/19.He was evaluated by Dr Cochran  10/2023 for LE discoloration with no intervention needed.     Today, he presents for follow up and overall he is feeling well.  He is having difficulty with knee pain.  He is receiving treatment at the [facility].  He has not been taking the lasix daily.  He reports medication compliance and is tolerating. He denies any abnormal bleeding or bruising. He denies exertional chest pain/pressure, dyspnea at rest, worsening COULTER, PND, orthopnea, palpitations, lightheadedness, weight changes, changes in LE edema, and syncope.      He was a previous smoker and quit in 2007.      Review of Systems:  Constitutional: No fatigue, weakness, night sweats or fever.   HEENT: No new vision difficulties or ringing in the ears.  Respiratory: No new SOB, PND, orthopnea or cough.   Cardiovascular: See HPI   GI: No n/v, diarrhea, constipation, abdominal pain or changes in bowel habits.  No melena, no hematochezia  : No urinary frequency, urgency, incontinence, hematuria or dysuria.  Skin: No cyanosis or skin lesions.  Musculoskeletal: No new muscle pain. +chronic bilateral knee pain   Neurological: No syncope or TIA-like symptoms.  Psychiatric: No anxiety, insomnia or depression     Past Medical History:   Diagnosis Date    AAA (abdominal aortic aneurysm) 07/2023    2.9 cm / Repeat in 5 years.    Former smoker     Quit 2007    History of atrial fibrillation 06/21/2018    Dr. Mohamud / Cardioversion 7-24-18; DCCV 8/2019    Hypercholesterolemia     Morbid obesity due to excess calories (HCC)     Obstructive sleep

## 2025-05-08 ENCOUNTER — OFFICE VISIT (OUTPATIENT)
Dept: CARDIOLOGY CLINIC | Age: 75
End: 2025-05-08
Payer: MEDICARE

## 2025-05-08 VITALS
HEIGHT: 67 IN | HEART RATE: 53 BPM | BODY MASS INDEX: 48.65 KG/M2 | OXYGEN SATURATION: 99 % | WEIGHT: 310 LBS | DIASTOLIC BLOOD PRESSURE: 94 MMHG | SYSTOLIC BLOOD PRESSURE: 140 MMHG

## 2025-05-08 DIAGNOSIS — E66.01 MORBID OBESITY (HCC): ICD-10-CM

## 2025-05-08 DIAGNOSIS — G47.33 OSA ON CPAP: ICD-10-CM

## 2025-05-08 DIAGNOSIS — R94.31 ABNORMAL ELECTROCARDIOGRAM (ECG) (EKG): ICD-10-CM

## 2025-05-08 DIAGNOSIS — I48.0 PAROXYSMAL ATRIAL FIBRILLATION (HCC): ICD-10-CM

## 2025-05-08 DIAGNOSIS — I48.0 PAROXYSMAL ATRIAL FIBRILLATION (HCC): Primary | ICD-10-CM

## 2025-05-08 DIAGNOSIS — I13.0 HYPERTENSIVE HEART AND CHRONIC KIDNEY DISEASE WITH HEART FAILURE AND STAGE 1 THROUGH STAGE 4 CHRONIC KIDNEY DISEASE, OR UNSPECIFIED CHRONIC KIDNEY DISEASE (HCC): ICD-10-CM

## 2025-05-08 DIAGNOSIS — I48.91 UNSPECIFIED ATRIAL FIBRILLATION (HCC): ICD-10-CM

## 2025-05-08 LAB
ALBUMIN SERPL-MCNC: 4.3 G/DL (ref 3.4–5)
ALBUMIN/GLOB SERPL: 1.7 {RATIO} (ref 1.1–2.2)
ALP SERPL-CCNC: 119 U/L (ref 40–129)
ALT SERPL-CCNC: 15 U/L (ref 10–40)
ANION GAP SERPL CALCULATED.3IONS-SCNC: 10 MMOL/L (ref 3–16)
AST SERPL-CCNC: 19 U/L (ref 15–37)
BILIRUB SERPL-MCNC: <0.2 MG/DL (ref 0–1)
BUN SERPL-MCNC: 23 MG/DL (ref 7–20)
CALCIUM SERPL-MCNC: 8.9 MG/DL (ref 8.3–10.6)
CHLORIDE SERPL-SCNC: 106 MMOL/L (ref 99–110)
CO2 SERPL-SCNC: 25 MMOL/L (ref 21–32)
CREAT SERPL-MCNC: 1.1 MG/DL (ref 0.8–1.3)
GFR SERPLBLD CREATININE-BSD FMLA CKD-EPI: 71 ML/MIN/{1.73_M2}
GLUCOSE SERPL-MCNC: 84 MG/DL (ref 70–99)
NT-PROBNP SERPL-MCNC: 766 PG/ML (ref 0–124)
POTASSIUM SERPL-SCNC: 4.4 MMOL/L (ref 3.5–5.1)
PROT SERPL-MCNC: 6.9 G/DL (ref 6.4–8.2)
SODIUM SERPL-SCNC: 141 MMOL/L (ref 136–145)
TSH SERPL DL<=0.005 MIU/L-ACNC: 1.41 UIU/ML (ref 0.27–4.2)

## 2025-05-08 PROCEDURE — 1123F ACP DISCUSS/DSCN MKR DOCD: CPT | Performed by: INTERNAL MEDICINE

## 2025-05-08 PROCEDURE — 93000 ELECTROCARDIOGRAM COMPLETE: CPT | Performed by: INTERNAL MEDICINE

## 2025-05-08 PROCEDURE — 1036F TOBACCO NON-USER: CPT | Performed by: INTERNAL MEDICINE

## 2025-05-08 PROCEDURE — 99214 OFFICE O/P EST MOD 30 MIN: CPT | Performed by: INTERNAL MEDICINE

## 2025-05-08 PROCEDURE — G8417 CALC BMI ABV UP PARAM F/U: HCPCS | Performed by: INTERNAL MEDICINE

## 2025-05-08 PROCEDURE — 3017F COLORECTAL CA SCREEN DOC REV: CPT | Performed by: INTERNAL MEDICINE

## 2025-05-08 PROCEDURE — G8427 DOCREV CUR MEDS BY ELIG CLIN: HCPCS | Performed by: INTERNAL MEDICINE

## 2025-05-08 PROCEDURE — 1159F MED LIST DOCD IN RCRD: CPT | Performed by: INTERNAL MEDICINE

## 2025-05-08 RX ORDER — AMIODARONE HYDROCHLORIDE 200 MG/1
100 TABLET ORAL DAILY
Qty: 90 TABLET | Refills: 5
Start: 2025-05-08

## 2025-05-12 ENCOUNTER — RESULTS FOLLOW-UP (OUTPATIENT)
Dept: CARDIOLOGY CLINIC | Age: 75
End: 2025-05-12

## 2025-05-12 ENCOUNTER — HOSPITAL ENCOUNTER (OUTPATIENT)
Dept: CARDIOLOGY | Age: 75
Discharge: HOME | End: 2025-05-14
Attending: INTERNAL MEDICINE
Payer: MEDICARE

## 2025-05-12 VITALS
BODY MASS INDEX: 48.65 KG/M2 | WEIGHT: 310 LBS | HEIGHT: 67 IN | SYSTOLIC BLOOD PRESSURE: 138 MMHG | DIASTOLIC BLOOD PRESSURE: 80 MMHG

## 2025-05-12 DIAGNOSIS — R94.31 ABNORMAL ELECTROCARDIOGRAM (ECG) (EKG): ICD-10-CM

## 2025-05-12 LAB
ECHO AO ASC DIAM: 4.1 CM
ECHO AO ASCENDING AORTA INDEX: 1.68 CM/M2
ECHO AO ROOT DIAM: 3.6 CM
ECHO AO ROOT INDEX: 1.48 CM/M2
ECHO AV AREA PEAK VELOCITY: 2 CM2
ECHO AV AREA VTI: 2 CM2
ECHO AV AREA/BSA PEAK VELOCITY: 0.8 CM2/M2
ECHO AV AREA/BSA VTI: 0.8 CM2/M2
ECHO AV MEAN GRADIENT: 6 MMHG
ECHO AV MEAN VELOCITY: 1.2 M/S
ECHO AV PEAK GRADIENT: 11 MMHG
ECHO AV PEAK VELOCITY: 1.6 M/S
ECHO AV VELOCITY RATIO: 0.56
ECHO AV VTI: 48 CM
ECHO BSA: 2.58 M2
ECHO EST RA PRESSURE: 3 MMHG
ECHO LA AREA 2C: 26.1 CM2
ECHO LA AREA 4C: 27.8 CM2
ECHO LA DIAMETER INDEX: 1.72 CM/M2
ECHO LA DIAMETER: 4.2 CM
ECHO LA MAJOR AXIS: 6.8 CM
ECHO LA MINOR AXIS: 5.9 CM
ECHO LA TO AORTIC ROOT RATIO: 1.17
ECHO LA VOL BP: 102 ML (ref 18–58)
ECHO LA VOL MOD A2C: 95 ML (ref 18–58)
ECHO LA VOL MOD A4C: 96 ML (ref 18–58)
ECHO LA VOL/BSA BIPLANE: 42 ML/M2 (ref 16–34)
ECHO LA VOLUME INDEX MOD A2C: 39 ML/M2 (ref 16–34)
ECHO LA VOLUME INDEX MOD A4C: 39 ML/M2 (ref 16–34)
ECHO LV E' LATERAL VELOCITY: 9.61 CM/S
ECHO LV E' SEPTAL VELOCITY: 6.64 CM/S
ECHO LV EDV A2C: 165 ML
ECHO LV EDV A4C: 162 ML
ECHO LV EDV INDEX A4C: 66 ML/M2
ECHO LV EDV NDEX A2C: 68 ML/M2
ECHO LV EF PHYSICIAN: 48 %
ECHO LV EJECTION FRACTION A2C: 60 %
ECHO LV EJECTION FRACTION A4C: 53 %
ECHO LV ESV A2C: 67 ML
ECHO LV ESV A4C: 76 ML
ECHO LV ESV INDEX A2C: 27 ML/M2
ECHO LV ESV INDEX A4C: 31 ML/M2
ECHO LV FRACTIONAL SHORTENING: 29 % (ref 28–44)
ECHO LV INTERNAL DIMENSION DIASTOLE INDEX: 2.54 CM/M2
ECHO LV INTERNAL DIMENSION DIASTOLIC: 6.2 CM (ref 4.2–5.9)
ECHO LV INTERNAL DIMENSION SYSTOLIC INDEX: 1.8 CM/M2
ECHO LV INTERNAL DIMENSION SYSTOLIC: 4.4 CM
ECHO LV IVSD: 0.8 CM (ref 0.6–1)
ECHO LV MASS 2D: 212.5 G (ref 88–224)
ECHO LV MASS INDEX 2D: 87.1 G/M2 (ref 49–115)
ECHO LV POSTERIOR WALL DIASTOLIC: 0.9 CM (ref 0.6–1)
ECHO LV RELATIVE WALL THICKNESS RATIO: 0.29
ECHO LVOT AREA: 3.8 CM2
ECHO LVOT AV VTI INDEX: 0.54
ECHO LVOT DIAM: 2.2 CM
ECHO LVOT MEAN GRADIENT: 2 MMHG
ECHO LVOT PEAK GRADIENT: 3 MMHG
ECHO LVOT PEAK VELOCITY: 0.9 M/S
ECHO LVOT STROKE VOLUME INDEX: 40 ML/M2
ECHO LVOT SV: 97.6 ML
ECHO LVOT VTI: 25.7 CM
ECHO MV A VELOCITY: 1.06 M/S
ECHO MV AREA VTI: 1.6 CM2
ECHO MV E VELOCITY: 0.75 M/S
ECHO MV E/A RATIO: 0.71
ECHO MV E/E' LATERAL: 7.8
ECHO MV E/E' RATIO (AVERAGED): 9.55
ECHO MV E/E' SEPTAL: 11.3
ECHO MV LVOT VTI INDEX: 2.32
ECHO MV MAX VELOCITY: 1.1 M/S
ECHO MV MEAN GRADIENT: 2 MMHG
ECHO MV MEAN VELOCITY: 0.7 M/S
ECHO MV PEAK GRADIENT: 5 MMHG
ECHO MV VTI: 59.6 CM
ECHO RA AREA 4C: 19.6 CM2
ECHO RA END SYSTOLIC VOLUME APICAL 4 CHAMBER INDEX BSA: 20 ML/M2
ECHO RA VOLUME: 50 ML
ECHO RV BASAL DIMENSION: 3.6 CM
ECHO RV FREE WALL PEAK S': 13.6 CM/S
ECHO RV LONGITUDINAL DIMENSION: 7.6 CM
ECHO RV MID DIMENSION: 2.7 CM
ECHO RV TAPSE: 3.1 CM (ref 1.7–?)

## 2025-05-12 PROCEDURE — 93306 TTE W/DOPPLER COMPLETE: CPT

## 2025-05-12 PROCEDURE — 93306 TTE W/DOPPLER COMPLETE: CPT | Performed by: INTERNAL MEDICINE

## 2025-06-23 ENCOUNTER — E-VISIT (OUTPATIENT)
Dept: FAMILY MEDICINE CLINIC | Age: 75
End: 2025-06-23
Payer: MEDICARE

## 2025-06-23 DIAGNOSIS — J06.9 VIRAL URI: Primary | ICD-10-CM

## 2025-06-23 PROCEDURE — 99421 OL DIG E/M SVC 5-10 MIN: CPT | Performed by: FAMILY MEDICINE

## 2025-06-23 RX ORDER — CODEINE PHOSPHATE AND GUAIFENESIN 10; 100 MG/5ML; MG/5ML
5 SOLUTION ORAL 3 TIMES DAILY PRN
Qty: 150 ML | Refills: 0 | Status: SHIPPED | OUTPATIENT
Start: 2025-06-23 | End: 2025-07-03

## 2025-06-23 RX ORDER — GUAIFENESIN AND PSEUDOEPHEDRINE HCL 1200; 120 MG/1; MG/1
1 TABLET, EXTENDED RELEASE ORAL 2 TIMES DAILY PRN
Qty: 20 TABLET | Refills: 0 | Status: SHIPPED | OUTPATIENT
Start: 2025-06-23

## 2025-06-23 ASSESSMENT — LIFESTYLE VARIABLES
SMOKING_YEARS: 40
SMOKING_STATUS: NO, I'M A FORMER SMOKER
PACKS_PER_DAY: 1

## 2025-06-23 NOTE — PROGRESS NOTES
Raza Nix (1950) initiated an asynchronous digital communication through [employer].    HPI: Patient initiated an E-visit with a 1 week history of a productive cough that occurs in paroxysms.  He has thin yellow / green sputum.   He also has nasal congestion with thin yellow / green mucus.  He denies fever and is a former smoker.  His symptoms are gradually improving.  He is using Robitussin with minimal improvement.      Exam: not applicable    Diagnoses and all orders for this visit:  Viral URI    I explained that this is most likely viral and that antibiotics should not be used.    Rx:  Mucinex-D  1 by mouth every 12 hrs as needed for congestion.   Rx  Tussi Organidin for cough  Increase fluids        10 minutes were spent on the digital evaluation and management of this patient.    DUSTY SNELL, DO

## 2025-08-04 ENCOUNTER — OFFICE VISIT (OUTPATIENT)
Dept: FAMILY MEDICINE CLINIC | Age: 75
End: 2025-08-04
Payer: MEDICARE

## 2025-08-04 ENCOUNTER — RESULTS FOLLOW-UP (OUTPATIENT)
Dept: FAMILY MEDICINE CLINIC | Age: 75
End: 2025-08-04

## 2025-08-04 VITALS — SYSTOLIC BLOOD PRESSURE: 132 MMHG | BODY MASS INDEX: 44.07 KG/M2 | WEIGHT: 281.4 LBS | DIASTOLIC BLOOD PRESSURE: 84 MMHG

## 2025-08-04 DIAGNOSIS — H81.13 BENIGN PAROXYSMAL POSITIONAL VERTIGO DUE TO BILATERAL VESTIBULAR DISORDER: ICD-10-CM

## 2025-08-04 DIAGNOSIS — G47.33 OBSTRUCTIVE SLEEP APNEA: ICD-10-CM

## 2025-08-04 DIAGNOSIS — R25.1 TREMOR: ICD-10-CM

## 2025-08-04 DIAGNOSIS — Z86.79 HISTORY OF ATRIAL FIBRILLATION: Primary | ICD-10-CM

## 2025-08-04 DIAGNOSIS — Z00.00 PREVENTATIVE HEALTH CARE: ICD-10-CM

## 2025-08-04 DIAGNOSIS — E66.01 MORBID OBESITY DUE TO EXCESS CALORIES (HCC): ICD-10-CM

## 2025-08-04 DIAGNOSIS — I71.40 ABDOMINAL AORTIC ANEURYSM (AAA) WITHOUT RUPTURE, UNSPECIFIED PART: ICD-10-CM

## 2025-08-04 LAB
ANION GAP SERPL CALCULATED.3IONS-SCNC: 12 MMOL/L (ref 3–16)
BUN SERPL-MCNC: 22 MG/DL (ref 7–20)
CALCIUM SERPL-MCNC: 9.1 MG/DL (ref 8.3–10.6)
CHLORIDE SERPL-SCNC: 105 MMOL/L (ref 99–110)
CHOLEST SERPL-MCNC: 205 MG/DL (ref 0–199)
CO2 SERPL-SCNC: 25 MMOL/L (ref 21–32)
CREAT SERPL-MCNC: 1.3 MG/DL (ref 0.8–1.3)
GFR SERPLBLD CREATININE-BSD FMLA CKD-EPI: 58 ML/MIN/{1.73_M2}
GLUCOSE SERPL-MCNC: 96 MG/DL (ref 70–99)
HDLC SERPL-MCNC: 53 MG/DL (ref 40–60)
LDLC SERPL CALC-MCNC: 137 MG/DL
POTASSIUM SERPL-SCNC: 4 MMOL/L (ref 3.5–5.1)
SODIUM SERPL-SCNC: 142 MMOL/L (ref 136–145)
TRIGL SERPL-MCNC: 73 MG/DL (ref 0–150)
VLDLC SERPL CALC-MCNC: 15 MG/DL

## 2025-08-04 PROCEDURE — G2211 COMPLEX E/M VISIT ADD ON: HCPCS | Performed by: FAMILY MEDICINE

## 2025-08-04 PROCEDURE — G8417 CALC BMI ABV UP PARAM F/U: HCPCS | Performed by: FAMILY MEDICINE

## 2025-08-04 PROCEDURE — 3017F COLORECTAL CA SCREEN DOC REV: CPT | Performed by: FAMILY MEDICINE

## 2025-08-04 PROCEDURE — 1159F MED LIST DOCD IN RCRD: CPT | Performed by: FAMILY MEDICINE

## 2025-08-04 PROCEDURE — 99214 OFFICE O/P EST MOD 30 MIN: CPT | Performed by: FAMILY MEDICINE

## 2025-08-04 PROCEDURE — 1123F ACP DISCUSS/DSCN MKR DOCD: CPT | Performed by: FAMILY MEDICINE

## 2025-08-04 PROCEDURE — 1160F RVW MEDS BY RX/DR IN RCRD: CPT | Performed by: FAMILY MEDICINE

## 2025-08-04 PROCEDURE — 1036F TOBACCO NON-USER: CPT | Performed by: FAMILY MEDICINE

## 2025-08-04 PROCEDURE — 36415 COLL VENOUS BLD VENIPUNCTURE: CPT | Performed by: FAMILY MEDICINE

## 2025-08-04 PROCEDURE — G8427 DOCREV CUR MEDS BY ELIG CLIN: HCPCS | Performed by: FAMILY MEDICINE

## 2025-08-04 SDOH — ECONOMIC STABILITY: FOOD INSECURITY: WITHIN THE PAST 12 MONTHS, YOU WORRIED THAT YOUR FOOD WOULD RUN OUT BEFORE YOU GOT MONEY TO BUY MORE.: NEVER TRUE

## 2025-08-04 SDOH — ECONOMIC STABILITY: FOOD INSECURITY: WITHIN THE PAST 12 MONTHS, THE FOOD YOU BOUGHT JUST DIDN'T LAST AND YOU DIDN'T HAVE MONEY TO GET MORE.: NEVER TRUE

## 2025-08-04 ASSESSMENT — PATIENT HEALTH QUESTIONNAIRE - PHQ9
2. FEELING DOWN, DEPRESSED OR HOPELESS: NOT AT ALL
SUM OF ALL RESPONSES TO PHQ QUESTIONS 1-9: 0
1. LITTLE INTEREST OR PLEASURE IN DOING THINGS: NOT AT ALL
SUM OF ALL RESPONSES TO PHQ QUESTIONS 1-9: 0

## 2025-08-04 ASSESSMENT — ENCOUNTER SYMPTOMS: SHORTNESS OF BREATH: 0

## 2025-08-12 ENCOUNTER — TELEPHONE (OUTPATIENT)
Dept: CARDIOLOGY CLINIC | Age: 75
End: 2025-08-12

## 2025-08-12 DIAGNOSIS — R06.02 SOB (SHORTNESS OF BREATH): ICD-10-CM

## 2025-08-12 DIAGNOSIS — R53.83 OTHER FATIGUE: Primary | ICD-10-CM

## 2025-08-12 DIAGNOSIS — I48.0 PAROXYSMAL ATRIAL FIBRILLATION (HCC): ICD-10-CM

## 2025-08-13 ENCOUNTER — CLINICAL SUPPORT (OUTPATIENT)
Dept: CARDIOLOGY CLINIC | Age: 75
End: 2025-08-13
Payer: MEDICARE

## 2025-08-13 VITALS
DIASTOLIC BLOOD PRESSURE: 86 MMHG | WEIGHT: 280.6 LBS | HEIGHT: 67 IN | RESPIRATION RATE: 18 BRPM | SYSTOLIC BLOOD PRESSURE: 148 MMHG | OXYGEN SATURATION: 95 % | HEART RATE: 52 BPM | BODY MASS INDEX: 44.04 KG/M2

## 2025-08-13 DIAGNOSIS — I48.0 PAROXYSMAL ATRIAL FIBRILLATION (HCC): Primary | ICD-10-CM

## 2025-08-13 DIAGNOSIS — R06.02 SOB (SHORTNESS OF BREATH): ICD-10-CM

## 2025-08-13 DIAGNOSIS — R53.83 OTHER FATIGUE: ICD-10-CM

## 2025-08-13 LAB
BASOPHILS # BLD: 0 K/UL (ref 0–0.2)
BASOPHILS NFR BLD: 0.6 %
DEPRECATED RDW RBC AUTO: 14.7 % (ref 12.4–15.4)
EOSINOPHIL # BLD: 0.2 K/UL (ref 0–0.6)
EOSINOPHIL NFR BLD: 4.5 %
HCT VFR BLD AUTO: 38.7 % (ref 40.5–52.5)
HGB BLD-MCNC: 12.7 G/DL (ref 13.5–17.5)
LYMPHOCYTES # BLD: 1.3 K/UL (ref 1–5.1)
LYMPHOCYTES NFR BLD: 25.5 %
MCH RBC QN AUTO: 29.6 PG (ref 26–34)
MCHC RBC AUTO-ENTMCNC: 32.7 G/DL (ref 31–36)
MCV RBC AUTO: 90.6 FL (ref 80–100)
MONOCYTES # BLD: 0.5 K/UL (ref 0–1.3)
MONOCYTES NFR BLD: 10.7 %
NEUTROPHILS # BLD: 3 K/UL (ref 1.7–7.7)
NEUTROPHILS NFR BLD: 58.7 %
NT-PROBNP SERPL-MCNC: 988 PG/ML (ref 0–124)
PLATELET # BLD AUTO: 246 K/UL (ref 135–450)
PMV BLD AUTO: 8.6 FL (ref 5–10.5)
RBC # BLD AUTO: 4.27 M/UL (ref 4.2–5.9)
WBC # BLD AUTO: 5.1 K/UL (ref 4–11)

## 2025-08-13 PROCEDURE — 93000 ELECTROCARDIOGRAM COMPLETE: CPT | Performed by: INTERNAL MEDICINE

## 2025-08-13 RX ORDER — FUROSEMIDE 40 MG/1
40 TABLET ORAL DAILY
Qty: 90 TABLET | Refills: 3
Start: 2025-08-13

## 2025-08-13 RX ORDER — IBUPROFEN 200 MG
200 TABLET ORAL EVERY 6 HOURS PRN
COMMUNITY

## 2025-08-14 DIAGNOSIS — R93.1 ABNORMAL FINDINGS ON DIAGNOSTIC IMAGING OF HEART AND CORONARY CIRCULATION: ICD-10-CM

## 2025-08-14 DIAGNOSIS — Z91.89 AT RISK FOR ACUTE ISCHEMIC CARDIAC EVENT: ICD-10-CM

## 2025-08-14 DIAGNOSIS — I48.0 PAROXYSMAL ATRIAL FIBRILLATION (HCC): ICD-10-CM

## 2025-08-14 DIAGNOSIS — E66.813 CLASS 3 SEVERE OBESITY WITH SERIOUS COMORBIDITY AND BODY MASS INDEX (BMI) OF 40.0 TO 44.9 IN ADULT, UNSPECIFIED OBESITY TYPE (HCC): ICD-10-CM

## 2025-08-14 DIAGNOSIS — R06.02 SOB (SHORTNESS OF BREATH): ICD-10-CM

## 2025-08-14 DIAGNOSIS — I50.20 HFREF (HEART FAILURE WITH REDUCED EJECTION FRACTION) (HCC): ICD-10-CM

## 2025-08-14 DIAGNOSIS — R53.83 OTHER FATIGUE: ICD-10-CM

## 2025-08-14 DIAGNOSIS — Z79.899 ON AMIODARONE THERAPY: Primary | ICD-10-CM

## 2025-08-14 RX ORDER — METOPROLOL TARTRATE 50 MG
TABLET ORAL
Qty: 3 TABLET | Refills: 0 | Status: SHIPPED | OUTPATIENT
Start: 2025-08-14

## 2025-08-14 RX ORDER — NITROGLYCERIN 0.4 MG/1
0.8 TABLET SUBLINGUAL
OUTPATIENT
Start: 2025-08-14

## 2025-08-14 RX ORDER — SODIUM CHLORIDE 0.9 % (FLUSH) 0.9 %
5-40 SYRINGE (ML) INJECTION PRN
OUTPATIENT
Start: 2025-08-14

## 2025-08-14 RX ORDER — METOPROLOL TARTRATE 1 MG/ML
5 INJECTION, SOLUTION INTRAVENOUS EVERY 5 MIN PRN
OUTPATIENT
Start: 2025-08-14

## 2025-08-14 RX ORDER — SODIUM CHLORIDE 9 MG/ML
INJECTION, SOLUTION INTRAVENOUS PRN
OUTPATIENT
Start: 2025-08-14

## 2025-08-14 RX ORDER — NITROGLYCERIN 0.4 MG/1
0.4 TABLET SUBLINGUAL
OUTPATIENT
Start: 2025-08-14

## 2025-08-14 RX ORDER — SODIUM CHLORIDE 0.9 % (FLUSH) 0.9 %
5-40 SYRINGE (ML) INJECTION EVERY 12 HOURS SCHEDULED
OUTPATIENT
Start: 2025-08-14

## 2025-08-15 RX ORDER — METOPROLOL TARTRATE 50 MG
TABLET ORAL
Qty: 3 TABLET | Refills: 0 | OUTPATIENT
Start: 2025-08-15

## 2025-08-28 ENCOUNTER — HOSPITAL ENCOUNTER (OUTPATIENT)
Dept: CT IMAGING | Age: 75
Discharge: HOME | End: 2025-08-28
Attending: INTERNAL MEDICINE
Payer: MEDICARE

## 2025-08-28 ENCOUNTER — HOSPITAL ENCOUNTER (OUTPATIENT)
Dept: LAB | Age: 75
Discharge: HOME | End: 2025-08-28
Attending: INTERNAL MEDICINE
Payer: MEDICARE

## 2025-08-28 ENCOUNTER — HOSPITAL ENCOUNTER (OUTPATIENT)
Dept: PULMONOLOGY | Age: 75
Discharge: HOME | End: 2025-08-28
Attending: INTERNAL MEDICINE
Payer: MEDICARE

## 2025-08-28 VITALS
SYSTOLIC BLOOD PRESSURE: 140 MMHG | RESPIRATION RATE: 22 BRPM | HEART RATE: 61 BPM | DIASTOLIC BLOOD PRESSURE: 69 MMHG | OXYGEN SATURATION: 94 %

## 2025-08-28 DIAGNOSIS — Z79.899 ON AMIODARONE THERAPY: ICD-10-CM

## 2025-08-28 DIAGNOSIS — R53.83 OTHER FATIGUE: ICD-10-CM

## 2025-08-28 DIAGNOSIS — I50.20 HFREF (HEART FAILURE WITH REDUCED EJECTION FRACTION) (HCC): ICD-10-CM

## 2025-08-28 DIAGNOSIS — I48.0 PAROXYSMAL ATRIAL FIBRILLATION (HCC): ICD-10-CM

## 2025-08-28 DIAGNOSIS — E66.813 CLASS 3 SEVERE OBESITY WITH SERIOUS COMORBIDITY AND BODY MASS INDEX (BMI) OF 40.0 TO 44.9 IN ADULT, UNSPECIFIED OBESITY TYPE (HCC): ICD-10-CM

## 2025-08-28 DIAGNOSIS — Z91.89 AT RISK FOR ACUTE ISCHEMIC CARDIAC EVENT: ICD-10-CM

## 2025-08-28 DIAGNOSIS — R06.02 SOB (SHORTNESS OF BREATH): ICD-10-CM

## 2025-08-28 DIAGNOSIS — R93.1 ABNORMAL FINDINGS ON DIAGNOSTIC IMAGING OF HEART AND CORONARY CIRCULATION: ICD-10-CM

## 2025-08-28 LAB
D-DIMER QUANTITATIVE: 0.5 UG/ML FEU (ref 0–0.6)
DLCO %PRED: 104 %
DLCO PRED: NORMAL
DLCO/VA %PRED: 106 %
DLCO/VA PRED: NORMAL
DLCO/VA: 4.35 ML/MIN/MMHG
DLCO: 23.51 ML/MIN/MMHG
EXPIRATORY TIME-POST: NORMAL
EXPIRATORY TIME: NORMAL
FEF 25-75 %CHNG: NORMAL
FEF 25-75 POST %PRED: NORMAL
FEF 25-75% %PRED-PRE: NORMAL
FEF 25-75% PRED: NORMAL
FEF 25-75-POST: NORMAL
FEF 25-75-PRE: NORMAL
FEV1 %PRED-POST: 85 %
FEV1 %PRED-PRE: 81 %
FEV1 PRED: NORMAL
FEV1-POST: NORMAL
FEV1-PRE: NORMAL
FEV1/FVC %PRED-POST: NORMAL
FEV1/FVC %PRED-PRE: NORMAL
FEV1/FVC PRED: NORMAL
FEV1/FVC-POST: 79 %
FEV1/FVC-PRE: 72 %
FVC %PRED-POST: 82 L
FVC %PRED-PRE: 86 %
FVC PRED: NORMAL
FVC-POST: 2.85 L
FVC-PRE: 3.09 L
GAW %PRED: NORMAL
GAW PRED: NORMAL
GAW: NORMAL
IC PRE %PRED: NORMAL
IC PRED: NORMAL
IC: NORMAL
MEP: NORMAL
MIP: NORMAL
MVV %PRED-PRE: NORMAL
MVV PRED: NORMAL
MVV-PRE: NORMAL
PEF %PRED-POST: NORMAL
PEF %PRED-PRE: NORMAL
PEF PRED: NORMAL
PEF%CHNG: NORMAL
PEF-POST: NORMAL
PEF-PRE: NORMAL
RAW %PRED: 67 %
RAW PRED: NORMAL
RAW: NORMAL
RV PRE %PRED: 125 %
RV PRED: NORMAL
RV: NORMAL
SVC %PRED: NORMAL
SVC PRED: NORMAL
SVC: NORMAL
TLC PRE %PRED: 95 %
TLC PRED: NORMAL
TLC: NORMAL
VA %PRED: 97 %
VA PRED: NORMAL
VA: 5.41 L
VTG %PRED: 104 %
VTG PRED: NORMAL
VTG: NORMAL

## 2025-08-28 PROCEDURE — 94060 EVALUATION OF WHEEZING: CPT

## 2025-08-28 PROCEDURE — 75574 CT ANGIO HRT W/3D IMAGE: CPT

## 2025-08-28 PROCEDURE — 94664 DEMO&/EVAL PT USE INHALER: CPT

## 2025-08-28 PROCEDURE — 75574 CT ANGIO HRT W/3D IMAGE: CPT | Performed by: INTERNAL MEDICINE

## 2025-08-28 PROCEDURE — 94726 PLETHYSMOGRAPHY LUNG VOLUMES: CPT

## 2025-08-28 PROCEDURE — 94729 DIFFUSING CAPACITY: CPT

## 2025-08-28 PROCEDURE — 6370000000 HC RX 637 (ALT 250 FOR IP): Performed by: INTERNAL MEDICINE

## 2025-08-28 PROCEDURE — 36415 COLL VENOUS BLD VENIPUNCTURE: CPT

## 2025-08-28 PROCEDURE — 6360000004 HC RX CONTRAST MEDICATION: Performed by: INTERNAL MEDICINE

## 2025-08-28 PROCEDURE — 94640 AIRWAY INHALATION TREATMENT: CPT

## 2025-08-28 PROCEDURE — 85379 FIBRIN DEGRADATION QUANT: CPT

## 2025-08-28 RX ORDER — SODIUM CHLORIDE 0.9 % (FLUSH) 0.9 %
5-40 SYRINGE (ML) INJECTION EVERY 12 HOURS SCHEDULED
Status: DISCONTINUED | OUTPATIENT
Start: 2025-08-28 | End: 2025-08-29 | Stop reason: HOSPADM

## 2025-08-28 RX ORDER — SODIUM CHLORIDE 9 MG/ML
INJECTION, SOLUTION INTRAVENOUS PRN
Status: DISCONTINUED | OUTPATIENT
Start: 2025-08-28 | End: 2025-08-29 | Stop reason: HOSPADM

## 2025-08-28 RX ORDER — IOPAMIDOL 755 MG/ML
125 INJECTION, SOLUTION INTRAVASCULAR
Status: COMPLETED | OUTPATIENT
Start: 2025-08-28 | End: 2025-08-28

## 2025-08-28 RX ORDER — SODIUM CHLORIDE 0.9 % (FLUSH) 0.9 %
5-40 SYRINGE (ML) INJECTION PRN
Status: DISCONTINUED | OUTPATIENT
Start: 2025-08-28 | End: 2025-08-29 | Stop reason: HOSPADM

## 2025-08-28 RX ORDER — ALBUTEROL SULFATE 90 UG/1
4 INHALANT RESPIRATORY (INHALATION) ONCE
Status: COMPLETED | OUTPATIENT
Start: 2025-08-28 | End: 2025-08-28

## 2025-08-28 RX ORDER — NITROGLYCERIN 0.4 MG/1
0.8 TABLET SUBLINGUAL
Status: COMPLETED | OUTPATIENT
Start: 2025-08-28 | End: 2025-08-28

## 2025-08-28 RX ORDER — METOPROLOL TARTRATE 1 MG/ML
5 INJECTION, SOLUTION INTRAVENOUS EVERY 5 MIN PRN
Status: DISCONTINUED | OUTPATIENT
Start: 2025-08-28 | End: 2025-08-29 | Stop reason: HOSPADM

## 2025-08-28 RX ORDER — NITROGLYCERIN 0.4 MG/1
0.4 TABLET SUBLINGUAL
Status: COMPLETED | OUTPATIENT
Start: 2025-08-28 | End: 2025-08-28

## 2025-08-28 RX ADMIN — IOPAMIDOL 125 ML: 755 INJECTION, SOLUTION INTRAVENOUS at 14:17

## 2025-08-28 RX ADMIN — ALBUTEROL SULFATE 4 PUFF: 90 AEROSOL, METERED RESPIRATORY (INHALATION) at 12:13

## 2025-08-28 RX ADMIN — NITROGLYCERIN 0.8 MG: 0.4 TABLET SUBLINGUAL at 14:01

## 2025-08-28 ASSESSMENT — PULMONARY FUNCTION TESTS
FEV1/FVC_POST: 79
FEV1_PERCENT_PREDICTED_PRE: 81
FEV1/FVC_PRE: 72
FEV1_PERCENT_PREDICTED_POST: 85
FVC_PERCENT_PREDICTED_POST: 82
FVC_PERCENT_PREDICTED_PRE: 86
FVC_PRE: 3.09
FVC_POST: 2.85

## 2025-08-29 ENCOUNTER — TELEPHONE (OUTPATIENT)
Dept: CARDIOLOGY CLINIC | Age: 75
End: 2025-08-29

## 2025-08-29 DIAGNOSIS — R07.9 CHEST PAIN: ICD-10-CM

## 2025-08-29 DIAGNOSIS — R93.1 ABNORMAL COMPUTED TOMOGRAPHY ANGIOGRAPHY OF HEART: Primary | ICD-10-CM

## 2025-09-03 PROBLEM — R93.1 ABNORMAL COMPUTED TOMOGRAPHY ANGIOGRAPHY OF HEART: Status: ACTIVE | Noted: 2025-08-29
